# Patient Record
Sex: FEMALE | Race: WHITE | NOT HISPANIC OR LATINO | Employment: STUDENT | URBAN - METROPOLITAN AREA
[De-identification: names, ages, dates, MRNs, and addresses within clinical notes are randomized per-mention and may not be internally consistent; named-entity substitution may affect disease eponyms.]

---

## 2023-08-29 ENCOUNTER — HOSPITAL ENCOUNTER (OUTPATIENT)
Dept: DATA CONVERSION | Facility: HOSPITAL | Age: 23
Discharge: HOME | End: 2023-08-29
Payer: COMMERCIAL

## 2023-08-29 DIAGNOSIS — E55.9 VITAMIN D DEFICIENCY, UNSPECIFIED: ICD-10-CM

## 2023-08-29 DIAGNOSIS — E61.1 IRON DEFICIENCY: ICD-10-CM

## 2023-08-29 DIAGNOSIS — Z00.00 ENCOUNTER FOR GENERAL ADULT MEDICAL EXAMINATION WITHOUT ABNORMAL FINDINGS: ICD-10-CM

## 2023-08-29 DIAGNOSIS — Z13.6 ENCOUNTER FOR SCREENING FOR CARDIOVASCULAR DISORDERS: ICD-10-CM

## 2023-08-29 LAB
25(OH)D3 SERPL-MCNC: 18 NG/ML (ref 31–100)
ALBUMIN SERPL-MCNC: 4.1 GM/DL (ref 3.5–5)
ALBUMIN/GLOB SERPL: 1.3 RATIO (ref 1.5–3)
ALP BLD-CCNC: 47 U/L (ref 35–125)
ALT SERPL-CCNC: 11 U/L (ref 5–40)
ANION GAP SERPL CALCULATED.3IONS-SCNC: 11 MMOL/L (ref 0–19)
APPEARANCE PLAS: CLEAR
AST SERPL-CCNC: 13 U/L (ref 5–40)
BILIRUB SERPL-MCNC: 0.4 MG/DL (ref 0.1–1.2)
BUN SERPL-MCNC: 7 MG/DL (ref 8–25)
BUN/CREAT SERPL: 11.7 RATIO (ref 8–21)
CALCIUM SERPL-MCNC: 9.4 MG/DL (ref 8.5–10.4)
CHLORIDE SERPL-SCNC: 103 MMOL/L (ref 97–107)
CHOLEST SERPL-MCNC: 159 MG/DL (ref 133–200)
CHOLEST/HDLC SERPL: 4.7 RATIO
CO2 SERPL-SCNC: 24 MMOL/L (ref 24–31)
COLOR SPUN FLD: YELLOW
CREAT SERPL-MCNC: 0.6 MG/DL (ref 0.4–1.6)
DEPRECATED RDW RBC AUTO: 40.5 FL (ref 37–54)
ERYTHROCYTE [DISTWIDTH] IN BLOOD BY AUTOMATED COUNT: 12 % (ref 11.7–15)
FASTING STATUS PATIENT QL REPORTED: ABNORMAL
FERRITIN SERPL-MCNC: 122 NG/ML (ref 13–150)
GFR SERPL CREATININE-BSD FRML MDRD: 131 ML/MIN/1.73 M2
GLOBULIN SER-MCNC: 3.2 G/DL (ref 1.9–3.7)
GLUCOSE SERPL-MCNC: 94 MG/DL (ref 65–99)
HCT VFR BLD AUTO: 39 % (ref 36–44)
HDLC SERPL-MCNC: 34 MG/DL
HGB BLD-MCNC: 13.3 GM/DL (ref 12–15)
IRON SATN MFR SERPL: 24.2 % (ref 12–50)
IRON SERPL-MCNC: 64 UG/DL (ref 30–160)
LDLC SERPL CALC-MCNC: 115 MG/DL (ref 65–130)
MCH RBC QN AUTO: 31.3 PG (ref 26–34)
MCHC RBC AUTO-ENTMCNC: 34.1 % (ref 31–37)
MCV RBC AUTO: 91.8 FL (ref 80–100)
NRBC BLD-RTO: 0 /100 WBC
PLATELET # BLD AUTO: 270 K/UL (ref 150–450)
PMV BLD AUTO: 10 CU (ref 7–12.6)
POTASSIUM SERPL-SCNC: 4.1 MMOL/L (ref 3.4–5.1)
PROT SERPL-MCNC: 7.3 G/DL (ref 5.9–7.9)
RBC # BLD AUTO: 4.25 M/UL (ref 4–4.9)
SODIUM SERPL-SCNC: 138 MMOL/L (ref 133–145)
T4 FREE SERPL-MCNC: 1.5 NG/DL (ref 0.9–1.7)
TIBC SERPL-MCNC: 265 UG/DL (ref 228–428)
TRIGL SERPL-MCNC: 50 MG/DL (ref 40–150)
TSH SERPL DL<=0.05 MIU/L-ACNC: 0.26 MIU/L (ref 0.27–4.2)
WBC # BLD AUTO: 7.8 K/UL (ref 4.5–11)

## 2023-09-07 ENCOUNTER — HOSPITAL ENCOUNTER (OUTPATIENT)
Dept: DATA CONVERSION | Facility: HOSPITAL | Age: 23
Discharge: HOME | End: 2023-09-07
Payer: COMMERCIAL

## 2023-09-07 DIAGNOSIS — R11.2 NAUSEA WITH VOMITING, UNSPECIFIED: ICD-10-CM

## 2023-09-07 DIAGNOSIS — R50.9 FEVER, UNSPECIFIED: ICD-10-CM

## 2023-09-07 DIAGNOSIS — R82.998 OTHER ABNORMAL FINDINGS IN URINE: ICD-10-CM

## 2023-09-07 DIAGNOSIS — J34.89 OTHER SPECIFIED DISORDERS OF NOSE AND NASAL SINUSES: ICD-10-CM

## 2023-09-07 LAB
BACTERIA SPEC CULT: NORMAL
EUA DISCLAIMER: NORMAL
FLUAV RNA NPH QL NAA+PROBE: NEGATIVE
FLUBV RNA NPH QL NAA+PROBE: NEGATIVE
REPORT STATUS -LH SQ DATA CONVERSION: NORMAL
SARS-COV-2 RNA SPEC QL NAA+PROBE: NEGATIVE
SERVICE CMNT-IMP: NORMAL
SPECIMEN SOURCE: NORMAL

## 2023-11-19 DIAGNOSIS — E55.9 VITAMIN D DEFICIENCY: Primary | ICD-10-CM

## 2023-11-19 DIAGNOSIS — Z76.0 MEDICATION REFILL: ICD-10-CM

## 2023-11-20 RX ORDER — ERGOCALCIFEROL 1.25 MG/1
1 CAPSULE ORAL
Qty: 12 CAPSULE | Refills: 0 | OUTPATIENT
Start: 2023-11-20

## 2023-11-20 NOTE — TELEPHONE ENCOUNTER
Refill not appropriate.  She needs repeat vitamin D level testing to determine if high-dose supplementation is still appropriate.  Repeat labs ordered in this encounter.  If still low, can consider further prescription dose supplement.

## 2023-11-20 NOTE — TELEPHONE ENCOUNTER
Call placed to patient, no answer. Left message advising of PCP documentation below and lab hours to get the Vitamin D level done. Advised via vm to call back with any questions she may have.

## 2023-11-21 ENCOUNTER — LAB (OUTPATIENT)
Dept: LAB | Facility: LAB | Age: 23
End: 2023-11-21
Payer: COMMERCIAL

## 2023-11-21 DIAGNOSIS — E55.9 VITAMIN D DEFICIENCY: ICD-10-CM

## 2023-11-21 LAB — 25(OH)D3 SERPL-MCNC: 41 NG/ML (ref 31–100)

## 2023-11-21 PROCEDURE — 82306 VITAMIN D 25 HYDROXY: CPT

## 2023-11-21 PROCEDURE — 36415 COLL VENOUS BLD VENIPUNCTURE: CPT

## 2024-02-02 ENCOUNTER — HOSPITAL ENCOUNTER (OUTPATIENT)
Dept: CARDIOLOGY | Facility: HOSPITAL | Age: 24
Discharge: HOME | End: 2024-02-02
Payer: COMMERCIAL

## 2024-02-02 DIAGNOSIS — Z13.6 ENCOUNTER FOR SCREENING FOR CARDIOVASCULAR DISORDERS: ICD-10-CM

## 2024-02-02 PROCEDURE — 93005 ELECTROCARDIOGRAM TRACING: CPT

## 2024-02-04 ENCOUNTER — HOSPITAL ENCOUNTER (EMERGENCY)
Facility: HOSPITAL | Age: 24
Discharge: HOME | End: 2024-02-04
Attending: EMERGENCY MEDICINE
Payer: COMMERCIAL

## 2024-02-04 ENCOUNTER — APPOINTMENT (OUTPATIENT)
Dept: RADIOLOGY | Facility: HOSPITAL | Age: 24
End: 2024-02-04
Payer: COMMERCIAL

## 2024-02-04 ENCOUNTER — APPOINTMENT (OUTPATIENT)
Dept: CARDIOLOGY | Facility: HOSPITAL | Age: 24
End: 2024-02-04
Payer: COMMERCIAL

## 2024-02-04 VITALS
BODY MASS INDEX: 21.57 KG/M2 | HEART RATE: 94 BPM | HEIGHT: 64 IN | OXYGEN SATURATION: 100 % | RESPIRATION RATE: 20 BRPM | DIASTOLIC BLOOD PRESSURE: 86 MMHG | TEMPERATURE: 98.1 F | SYSTOLIC BLOOD PRESSURE: 140 MMHG | WEIGHT: 126.32 LBS

## 2024-02-04 DIAGNOSIS — Z71.6 ENCOUNTER FOR SMOKING CESSATION COUNSELING: ICD-10-CM

## 2024-02-04 DIAGNOSIS — J45.21 MILD INTERMITTENT ASTHMA WITH EXACERBATION (HHS-HCC): Primary | ICD-10-CM

## 2024-02-04 DIAGNOSIS — U07.1 COVID: ICD-10-CM

## 2024-02-04 LAB
ANION GAP SERPL CALC-SCNC: 9 MMOL/L
BUN SERPL-MCNC: 7 MG/DL (ref 8–25)
CALCIUM SERPL-MCNC: 9.3 MG/DL (ref 8.5–10.4)
CHLORIDE SERPL-SCNC: 102 MMOL/L (ref 97–107)
CO2 SERPL-SCNC: 27 MMOL/L (ref 24–31)
CREAT SERPL-MCNC: 0.6 MG/DL (ref 0.4–1.6)
EGFRCR SERPLBLD CKD-EPI 2021: >90 ML/MIN/1.73M*2
ERYTHROCYTE [DISTWIDTH] IN BLOOD BY AUTOMATED COUNT: 12.3 % (ref 11.5–14.5)
GLUCOSE SERPL-MCNC: 94 MG/DL (ref 65–99)
HCT VFR BLD AUTO: 41.7 % (ref 36–46)
HGB BLD-MCNC: 14.1 G/DL (ref 12–16)
MCH RBC QN AUTO: 31.5 PG (ref 26–34)
MCHC RBC AUTO-ENTMCNC: 33.8 G/DL (ref 32–36)
MCV RBC AUTO: 93 FL (ref 80–100)
NRBC BLD-RTO: 0 /100 WBCS (ref 0–0)
PLATELET # BLD AUTO: 224 X10*3/UL (ref 150–450)
POTASSIUM SERPL-SCNC: 4 MMOL/L (ref 3.4–5.1)
RBC # BLD AUTO: 4.47 X10*6/UL (ref 4–5.2)
SODIUM SERPL-SCNC: 138 MMOL/L (ref 133–145)
WBC # BLD AUTO: 5.6 X10*3/UL (ref 4.4–11.3)

## 2024-02-04 PROCEDURE — 85027 COMPLETE CBC AUTOMATED: CPT | Performed by: EMERGENCY MEDICINE

## 2024-02-04 PROCEDURE — 71045 X-RAY EXAM CHEST 1 VIEW: CPT

## 2024-02-04 PROCEDURE — 2500000004 HC RX 250 GENERAL PHARMACY W/ HCPCS (ALT 636 FOR OP/ED): Performed by: EMERGENCY MEDICINE

## 2024-02-04 PROCEDURE — 99283 EMERGENCY DEPT VISIT LOW MDM: CPT | Mod: 25

## 2024-02-04 PROCEDURE — 36415 COLL VENOUS BLD VENIPUNCTURE: CPT | Performed by: EMERGENCY MEDICINE

## 2024-02-04 PROCEDURE — 80048 BASIC METABOLIC PNL TOTAL CA: CPT | Performed by: EMERGENCY MEDICINE

## 2024-02-04 PROCEDURE — 93005 ELECTROCARDIOGRAM TRACING: CPT

## 2024-02-04 PROCEDURE — 2500000002 HC RX 250 W HCPCS SELF ADMINISTERED DRUGS (ALT 637 FOR MEDICARE OP, ALT 636 FOR OP/ED): Performed by: EMERGENCY MEDICINE

## 2024-02-04 PROCEDURE — 99284 EMERGENCY DEPT VISIT MOD MDM: CPT | Mod: 25 | Performed by: EMERGENCY MEDICINE

## 2024-02-04 PROCEDURE — 94640 AIRWAY INHALATION TREATMENT: CPT

## 2024-02-04 PROCEDURE — 2500000001 HC RX 250 WO HCPCS SELF ADMINISTERED DRUGS (ALT 637 FOR MEDICARE OP): Performed by: EMERGENCY MEDICINE

## 2024-02-04 RX ORDER — IPRATROPIUM BROMIDE AND ALBUTEROL SULFATE 2.5; .5 MG/3ML; MG/3ML
3 SOLUTION RESPIRATORY (INHALATION) ONCE
Status: COMPLETED | OUTPATIENT
Start: 2024-02-04 | End: 2024-02-04

## 2024-02-04 RX ORDER — IBUPROFEN 600 MG/1
600 TABLET ORAL ONCE
Status: COMPLETED | OUTPATIENT
Start: 2024-02-04 | End: 2024-02-04

## 2024-02-04 RX ORDER — ALBUTEROL SULFATE 0.83 MG/ML
2.5 SOLUTION RESPIRATORY (INHALATION) EVERY 6 HOURS PRN
Qty: 3 ML | Refills: 1 | Status: SHIPPED | OUTPATIENT
Start: 2024-02-04 | End: 2024-05-21

## 2024-02-04 RX ORDER — ACETAMINOPHEN 325 MG/1
650 TABLET ORAL ONCE
Status: COMPLETED | OUTPATIENT
Start: 2024-02-04 | End: 2024-02-04

## 2024-02-04 RX ORDER — NEBULIZER AND COMPRESSOR
1 EACH MISCELLANEOUS AS NEEDED
Qty: 1 EACH | Refills: 0 | Status: SHIPPED | OUTPATIENT
Start: 2024-02-04

## 2024-02-04 RX ORDER — PREDNISONE 10 MG/1
20 TABLET ORAL DAILY
Qty: 8 TABLET | Refills: 0 | Status: SHIPPED | OUTPATIENT
Start: 2024-02-04 | End: 2024-02-08

## 2024-02-04 RX ADMIN — IBUPROFEN 600 MG: 600 TABLET, FILM COATED ORAL at 11:56

## 2024-02-04 RX ADMIN — ACETAMINOPHEN 650 MG: 325 TABLET ORAL at 11:56

## 2024-02-04 RX ADMIN — DEXAMETHASONE 10 MG: 6 TABLET ORAL at 11:56

## 2024-02-04 RX ADMIN — IPRATROPIUM BROMIDE AND ALBUTEROL SULFATE 3 ML: 2.5; .5 SOLUTION RESPIRATORY (INHALATION) at 11:57

## 2024-02-04 ASSESSMENT — COLUMBIA-SUICIDE SEVERITY RATING SCALE - C-SSRS
6. HAVE YOU EVER DONE ANYTHING, STARTED TO DO ANYTHING, OR PREPARED TO DO ANYTHING TO END YOUR LIFE?: NO
2. HAVE YOU ACTUALLY HAD ANY THOUGHTS OF KILLING YOURSELF?: NO
1. IN THE PAST MONTH, HAVE YOU WISHED YOU WERE DEAD OR WISHED YOU COULD GO TO SLEEP AND NOT WAKE UP?: NO

## 2024-02-04 ASSESSMENT — PAIN SCALES - GENERAL: PAINLEVEL_OUTOF10: 0 - NO PAIN

## 2024-02-04 ASSESSMENT — PAIN - FUNCTIONAL ASSESSMENT: PAIN_FUNCTIONAL_ASSESSMENT: 0-10

## 2024-02-04 NOTE — DISCHARGE INSTRUCTIONS
As discussed, you can use over-the-counter ibuprofen Tylenol but also use albuterol nebulizer as needed and take the prednisone for the next few days.  If your symptoms worsen despite the medications may come back to the emergency room for repeat evaluation.

## 2024-02-04 NOTE — ED PROVIDER NOTES
EMERGENCY DEPARTMENT ENCOUNTER      [ ] CODE STEMI [ ] CODE Neuro [ ] CODE Yellow [ ] Modified Trauma [ ] CODE Blue      CHIEF COMPLAINT      Chief Complaint   Patient presents with    Shortness of Breath     Pt complains of cough, congestion, chest tightness, fatigue, weakness for a couple weeks.  Tested positive for covid today.       Mode of Arrival:  Primary Care Provider: Sylvie Renee MD  Medical Record Number: [id number]      History obtained by: Patient  Limited by nothing  Time seen: 12:48 PM    QUALITY MEASURES   PPE Utilized: N95 with goggles and gloves      HPI      Dayami Tafoya is a 23 y.o. female with a history of asthma, vape air, states that she developed general malaise fatigue fever and chills about 2 weeks ago with a dry cough but then about a week ago tested and was positive for COVID.  Does state that at some point she had been on Augmentin along with over-the-counter medications but today feels slightly more chest tightness and thinks that this is exacerbating her asthma.  Admits that she is still vaping but doing much less than before.  Also states that when she went to an urgent care a week ago to get the Augmentin was also told that her EKG had an abnormality that just required follow-up with cardiologist but has not attempted to yet.  Does have an albuterol MDI at home but does not have a nebulizer.  Has been taking over-the-counter Mucinex.  Thinks that there was a family member that was sick about 2 weeks ago.  Denies any arely chest pain or other tightness associated with asthma.  Denies any palpitations diaphoresis syncope.  Has been able to hydrate.  No other complaints.      Patient otherwise denies fever, chills, n/v/d, chest pain,   abdominal pain, dysuria, hematuria, swollen extremities or skin changes, hematemesis, hematochezia, melena or any other accompanying symptoms of late.      The patient has no other complaints at this time.               PAST MEDICAL HISTORY     Past Medical History:   Diagnosis Date    Other conditions influencing health status 11/07/2017    No significant past medical history    Other conditions influencing health status 11/07/2017    No significant past surgical history         I have personally reviewed the patient's past medical history in the records.  Kenneth Castillo MD    SURGICAL HISTORY    Past Surgical History:   Procedure Laterality Date    OTHER SURGICAL HISTORY  09/15/2021    Esophagogastroduodenoscopy    OTHER SURGICAL HISTORY  09/21/2021    Bronchoscopy       I have personally reviewed the patient's past surgical history in the records.  Kenneth Castillo MD    CURRENT MEDICATIONS    I have reviewed the patient’s medications.   Please see nursing and pharmacy records for the most up to date list.     [unfilled]    ALLERGIES    Allergies   Allergen Reactions    Latex Rash       I have personally reviewed the patient's past history of allergies in the records.  Kenneth Castillo MD    FAMILY HISTORY    No family history on file.    I have personally reviewed the patient's family history in the records.  Kenneth Castillo MD    SOCIAL HISTORY    Social History     Socioeconomic History    Marital status: Single     Spouse name: Not on file    Number of children: Not on file    Years of education: Not on file    Highest education level: Not on file   Occupational History    Not on file   Tobacco Use    Smoking status: Not on file    Smokeless tobacco: Not on file   Substance and Sexual Activity    Alcohol use: Not on file    Drug use: Not on file    Sexual activity: Not on file   Other Topics Concern    Not on file   Social History Narrative    Not on file     Social Determinants of Health     Financial Resource Strain: Not on file   Food Insecurity: Not on file   Transportation Needs: Not on file   Physical Activity: Not on file   Stress: Not on file   Social Connections: Not on file   Intimate Partner Violence: Not on file   Housing Stability: Not on file  "        I have personally reviewed the patient's social history in the records.  Kenneth Castillo MD    REVIEW OF SYSTEMS      14 point ROS was reviewed and negative except as noted above in HPI.      PHYSICAL EXAM    VITAL SIGNS:    /86   Pulse 94   Temp 36.7 °C (98.1 °F)   Resp 20   Ht 1.626 m (5' 4\")   Wt 57.3 kg (126 lb 5.2 oz)   SpO2 100%   BMI 21.68 kg/m²    Review EMR for vital signs  Nursing note and vitals reviewed.    Constitutional:  Alert and oriented, well-developed, well-nourished, appears stated age, non-toxic appearing  HENT:  Normocephalic, atraumatic, bilateral external ears normal, oropharynx moist, Nose normal.  Neck: normal range of motion, no tenderness, supple, no stridor.  Eyes:  PERRL, EOMI, conjunctiva normal, no discharge.   Cardiovascular:  Normal heart rate, normal rhythm, no murmurs, no rubs, no gallops.   Respiratory: Slightly decreased breath sounds, no respiratory distress, faint end expiratory wheezing, no chest wall tenderness.   GI:  Bowel sounds normal, soft, no tenderness, no rebound or guarding, no distention, no masses pulsatile or otherwise   (any female  exam was done with female chaperone present):   Deferred  Integument:  Warm, dry, no erythema, no rash, no edema.   Back:  No midline tenderness, no CVA tenderness.   Musculoskeletal:  Intact distal pulses, no tenderness, no cyanosis, no clubbing, with capillary refill less than 2 seconds. Good range of motion in all major joints. No tenderness to palpation or major deformities noted.    Neurologic:  Alert & oriented x 3, normal motor function, normal sensory function, no focal deficits noted. Cranial nerves II-XII intact.  Psychiatric:  Affect normal, judgment normal, mood normal.         EKG    Performed at   1202  , HR of   75 ,     NSR, NAD, no sign of STEMI or NSTEMI, no Q wave or T wave abnormality noted.    Reviewed and interpreted by me at time performed    Reviewed and interpreted by me, Kenneth Castillo " MD        CARDIAC MONITORING    Cardiac monitor reveals normal sinus rhythm as interpreted by me.   Cardiac monitor was ordered secondary to patient's history of chest pain/palpitations/near-syncope/syncope/sob/stroke and to monitor the patient for dysrhythmia.      RADIOLOGY  XR chest 1 view   Final Result   No acute cardiopulmonary disease.        Signed by: Bao Stinson 2/4/2024 12:17 PM   Dictation workstation:   [id number]          All Imaging studies evaluated and interpreted by ED physician except when noted otherwise.    ED PROVIDER INTERPRETATION (XRAYS ONLY):       *I have interpreted the x-ray real-time in the ED myself, and made a clinical decision on it prior to the formal radiology reading.    Kenneth Castillo M.D.    RADIOLOGIST IMPRESSION (U/S, CT, MRI):   XR chest 1 view   Final Result   No acute cardiopulmonary disease.        Signed by: Bao Stinson 2/4/2024 12:17 PM   Dictation workstation:   [id number]            PERTINENT LABS    Please refer to the chart for all lab work and to MDM for relevant discussion.      PROCEDURE    None (procdoc)    ED COURSE & MEDICAL DECISION MAKING    Pertinent Labs & Imaging studies reviewed. (See chart for details)      MDM:      Assessment: Dayami Tafoya is a 23 y.o.female who presents to the ED with suspected asthma exacerbation from a combination of COVID infection and continued vaping and discussed tobacco cessation with patient for which she states she would quit on her own without any medications however did agree to Decadron in the ED, DuoNebs, basic lab work chest x-ray and EKG and if she does improve we will give prednisone for the next 2 days as I suspect that there is a bronchitis component and will also recommend nebulizer machine along with albuterol nebulizer vials.  Patient understands and agrees with plan        Prior records in EPIC reviewed by me.     2023 Coding Requirements:  --Independent historian(s):    see HPI  --Review of  "prior records:    EHR reviewed   --Relevant comorbidities:    see records  --Social determinants of health:            I have considered the following conditions in my assessment of this patient's condition:  cough, asthma, chest pain including pleuritic, dyspnea, atrial fibrillation, congestive heart failure, pulmonary edema, diabetic ketoacidosis, myocardial infarct acute, pneumonia including   aspiration and pseudomonal wrist, pneumothorax including tension, pulmonary   embolus, respiratory failure, sepsis, SIRS, URI, bronchitis, foreign body aspiration.      Chest x-ray within normal limits and EKG unremarkable.  CBC within normal limits, patient feeling better after discussion patient she agrees to discharge with the medications as described above and strict turn precautions            ED VITALS  Vitals:    02/04/24 1047   BP: 140/86   Pulse: 94   Resp: 20   Temp: 36.7 °C (98.1 °F)   SpO2: 100%   Weight: 57.3 kg (126 lb 5.2 oz)   Height: 1.626 m (5' 4\")       BP  Min: 140/86  Max: 140/86    As part of the 2022 [facility] reporting requirements, the following measures have been reviewed and documented:    None     1. Mild intermittent asthma with exacerbation    2. COVID    3. Encounter for smoking cessation counseling        Diagnoses as of 02/04/24 1248   Mild intermittent asthma with exacerbation   COVID   Encounter for smoking cessation counseling         DISPOSITION       DISCHARGE.  The patient is discharged back to their place of residence.  Discharge diagnosis, instructions and plan were discussed and understood. At the time of discharge the patient was comfortable and was in no apparent distress. Patient is aware of diagnostic uncertainty and was notified though testing is negative here, there is a very small chance that pathology may be missed.  The patient understands these risks and the patient /family understood to return immediately to the emergency department if the symptoms worsen or if they have any " additional concerns.    DISCHARGE MEDICATIONS  New Prescriptions    No medications on file       FOLLOW UP  No follow-up provider specified.    Kenneth Castillo    This note was created with the assistance of voice recognition technology.  While attempts were made to ensure accuracy, mis-transcription may be present due to limitations in the software.        Electronically signed by MD Kenneth Valencia MD  02/04/24 6621

## 2024-02-12 LAB
ATRIAL RATE: 75 BPM
P AXIS: 72 DEGREES
P OFFSET: 197 MS
P ONSET: 157 MS
PR INTERVAL: 138 MS
Q ONSET: 226 MS
QRS COUNT: 12 BEATS
QRS DURATION: 82 MS
QT INTERVAL: 384 MS
QTC CALCULATION(BAZETT): 428 MS
QTC FREDERICIA: 413 MS
R AXIS: 77 DEGREES
T AXIS: 59 DEGREES
T OFFSET: 418 MS
VENTRICULAR RATE: 75 BPM

## 2024-02-14 LAB
ATRIAL RATE: 65 BPM
P AXIS: 64 DEGREES
P OFFSET: 199 MS
P ONSET: 153 MS
PR INTERVAL: 142 MS
Q ONSET: 224 MS
QRS COUNT: 10 BEATS
QRS DURATION: 88 MS
QT INTERVAL: 402 MS
QTC CALCULATION(BAZETT): 418 MS
QTC FREDERICIA: 412 MS
R AXIS: 89 DEGREES
T AXIS: 57 DEGREES
T OFFSET: 425 MS
VENTRICULAR RATE: 65 BPM

## 2024-05-21 ENCOUNTER — OFFICE VISIT (OUTPATIENT)
Dept: PRIMARY CARE | Facility: CLINIC | Age: 24
End: 2024-05-21
Payer: COMMERCIAL

## 2024-05-21 VITALS
OXYGEN SATURATION: 97 % | WEIGHT: 126 LBS | SYSTOLIC BLOOD PRESSURE: 100 MMHG | HEIGHT: 64 IN | BODY MASS INDEX: 21.51 KG/M2 | TEMPERATURE: 97.6 F | HEART RATE: 102 BPM | DIASTOLIC BLOOD PRESSURE: 78 MMHG

## 2024-05-21 DIAGNOSIS — J01.90 SUBACUTE SINUSITIS, UNSPECIFIED LOCATION: ICD-10-CM

## 2024-05-21 DIAGNOSIS — J30.2 SEASONAL ALLERGIES: Primary | ICD-10-CM

## 2024-05-21 PROCEDURE — 99214 OFFICE O/P EST MOD 30 MIN: CPT | Performed by: STUDENT IN AN ORGANIZED HEALTH CARE EDUCATION/TRAINING PROGRAM

## 2024-05-21 RX ORDER — DOXYCYCLINE 100 MG/1
100 CAPSULE ORAL 2 TIMES DAILY
Qty: 14 CAPSULE | Refills: 0 | Status: SHIPPED | OUTPATIENT
Start: 2024-05-21 | End: 2024-05-28

## 2024-05-21 RX ORDER — SODIUM CHLORIDE 1 G/1
TABLET ORAL
COMMUNITY

## 2024-05-21 RX ORDER — ERENUMAB-AOOE 70 MG/ML
INJECTION SUBCUTANEOUS
COMMUNITY

## 2024-05-21 RX ORDER — CEFDINIR 300 MG/1
300 CAPSULE ORAL 2 TIMES DAILY
Qty: 20 CAPSULE | Refills: 0 | Status: CANCELLED | OUTPATIENT
Start: 2024-05-21 | End: 2024-05-31

## 2024-05-21 RX ORDER — CETIRIZINE HYDROCHLORIDE 10 MG/1
10 TABLET ORAL DAILY
Qty: 30 TABLET | Refills: 2 | Status: SHIPPED | OUTPATIENT
Start: 2024-05-21 | End: 2024-08-19

## 2024-05-21 RX ORDER — FLUTICASONE PROPIONATE 50 MCG
1 SPRAY, SUSPENSION (ML) NASAL DAILY
Qty: 16 G | Refills: 1 | Status: SHIPPED | OUTPATIENT
Start: 2024-05-21 | End: 2025-05-21

## 2024-05-21 ASSESSMENT — PATIENT HEALTH QUESTIONNAIRE - PHQ9
1. LITTLE INTEREST OR PLEASURE IN DOING THINGS: NOT AT ALL
SUM OF ALL RESPONSES TO PHQ9 QUESTIONS 1 AND 2: 0
2. FEELING DOWN, DEPRESSED OR HOPELESS: NOT AT ALL

## 2024-05-21 ASSESSMENT — PAIN SCALES - GENERAL: PAINLEVEL: 8

## 2024-05-21 NOTE — PATIENT INSTRUCTIONS
Thank you for coming to see me today.    Antibiotics sent to pharmacy along with allergy medication/nasal spray, take full course of antibiotic as prescribed.    You are also overdue for your yearly physical, please schedule physical/follow-up visit with me in 2 weeks.  Sooner if needed.

## 2024-05-21 NOTE — PROGRESS NOTES
"Subjective   Dayami Tafoya is a 23 y.o. female who presents for sinus and right ear pain.    HPI:    This is a 23-year-old female presenting with concern for possible ear infection.    Ear Pain:  Presenting with ongoing right ear pain.  Finished a course of Augmentin from [facility] urgent care approximately 6 days ago.  Did not have any significant symptom relief.    Completed full course of Augmentin and ear drops (ciprodex x7 days).  Ongoing ear pain and sinus pressure/congestion.    Breathing a little worse, using albuterol inhaler 2-3 times daily, but relieved with albuterol use.    ROS:   Review of systems is essentially negative for all systems except for any identified issues in HPI above.    Objective     /78   Pulse 102   Temp 36.4 °C (97.6 °F)   Ht 1.626 m (5' 4\")   Wt 57.2 kg (126 lb)   SpO2 97%   BMI 21.63 kg/m²      PHYSICAL EXAM    GENERAL  Well-appearing, pleasant and cooperative.  No acute distress.    HEENT  HEAD:   Normocephalic.  Atraumatic.  EYES:  PERRLA.  No scleral icterus or conjunctival injection.  EARS:  Tympanic membranes visualized bilaterally without erythema, fluid, or bulging.  NECK:  No adenopathy.  No palpable thyroid enlargement or nodules.    THROAT:  Moist oropharynx without tonsillar enlargement or exudates.    LUNGS:    Clear to auscultation bilaterally.  No wheezes, rales, rhonchi.    CARDIAC:  Regular rate and rhythm.  Normal S1S2.  No murmurs/rubs/gallops.    ABDOMEN:  Soft, non-tender, non-distended.  No hepatosplenomegaly.  Normoactive bowel sounds.    MUSCULOSKELETAL:  No gross abnormalities.   No joint swelling or erythema,.  No spinal or paraspinal tenderness to palpation.    EXTREMITIES:  No LE edema or cyanosis.      NEURO           Alert and oriented x3. No focal deficits.    PSYCH:          Affect appropriate.           Assessment/Plan   Problem List Items Addressed This Visit    None  Visit Diagnoses       Seasonal allergies    -  Primary    " Relevant Medications    cetirizine (ZyrTEC) 10 mg tablet    fluticasone (Flonase) 50 mcg/actuation nasal spray    Subacute sinusitis, unspecified location        Relevant Medications    doxycycline (Vibramycin) 100 mg capsule        Ears examined without evidence of ongoing infection bilateratlly.  Will treat with additional abx course for sinus sx, as well as allergy medications.    Counseling:   Medication education:    Education: The patient is counseled regarding potential side effects of all new medications.    Understanding:  Patient expressed understanding.    Adherence:  No barriers to adherence identified.           Sylvie Renee MD

## 2024-05-29 ENCOUNTER — APPOINTMENT (OUTPATIENT)
Dept: RADIOLOGY | Facility: HOSPITAL | Age: 24
End: 2024-05-29
Payer: MEDICARE

## 2024-05-29 ENCOUNTER — HOSPITAL ENCOUNTER (EMERGENCY)
Facility: HOSPITAL | Age: 24
Discharge: HOME | End: 2024-05-29
Attending: EMERGENCY MEDICINE
Payer: MEDICARE

## 2024-05-29 VITALS
BODY MASS INDEX: 22.32 KG/M2 | WEIGHT: 130.73 LBS | OXYGEN SATURATION: 100 % | HEIGHT: 64 IN | TEMPERATURE: 99.1 F | RESPIRATION RATE: 18 BRPM | SYSTOLIC BLOOD PRESSURE: 136 MMHG | HEART RATE: 88 BPM | DIASTOLIC BLOOD PRESSURE: 78 MMHG

## 2024-05-29 DIAGNOSIS — S16.1XXA STRAIN OF NECK MUSCLE, INITIAL ENCOUNTER: ICD-10-CM
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
## 2024-05-29 DIAGNOSIS — S39.012A BACK STRAIN, INITIAL ENCOUNTER: ICD-10-CM

## 2024-05-29 DIAGNOSIS — S06.0XAA CONCUSSION WITH UNKNOWN LOSS OF CONSCIOUSNESS STATUS, INITIAL ENCOUNTER: ICD-10-CM

## 2024-05-29 LAB
ALBUMIN SERPL-MCNC: 4.3 G/DL (ref 3.5–5)
ALP BLD-CCNC: 47 U/L (ref 35–125)
ALT SERPL-CCNC: 12 U/L (ref 5–40)
ANION GAP SERPL CALC-SCNC: 9 MMOL/L
APPEARANCE UR: CLEAR
AST SERPL-CCNC: 14 U/L (ref 5–40)
BASOPHILS # BLD AUTO: 0.06 X10*3/UL (ref 0–0.1)
BASOPHILS NFR BLD AUTO: 0.8 %
BILIRUB SERPL-MCNC: 0.2 MG/DL (ref 0.1–1.2)
BILIRUB UR STRIP.AUTO-MCNC: NEGATIVE MG/DL
BUN SERPL-MCNC: 12 MG/DL (ref 8–25)
CALCIUM SERPL-MCNC: 9.1 MG/DL (ref 8.5–10.4)
CHLORIDE SERPL-SCNC: 102 MMOL/L (ref 97–107)
CO2 SERPL-SCNC: 25 MMOL/L (ref 24–31)
COLOR UR: COLORLESS
CREAT SERPL-MCNC: 0.7 MG/DL (ref 0.4–1.6)
EGFRCR SERPLBLD CKD-EPI 2021: >90 ML/MIN/1.73M*2
EOSINOPHIL # BLD AUTO: 0.19 X10*3/UL (ref 0–0.7)
EOSINOPHIL NFR BLD AUTO: 2.6 %
ERYTHROCYTE [DISTWIDTH] IN BLOOD BY AUTOMATED COUNT: 12 % (ref 11.5–14.5)
GLUCOSE SERPL-MCNC: 97 MG/DL (ref 65–99)
GLUCOSE UR STRIP.AUTO-MCNC: NORMAL MG/DL
HCG UR QL IA.RAPID: NEGATIVE
HCT VFR BLD AUTO: 43.2 % (ref 36–46)
HGB BLD-MCNC: 14.5 G/DL (ref 12–16)
IMM GRANULOCYTES # BLD AUTO: 0.03 X10*3/UL (ref 0–0.7)
IMM GRANULOCYTES NFR BLD AUTO: 0.4 % (ref 0–0.9)
KETONES UR STRIP.AUTO-MCNC: NEGATIVE MG/DL
LEUKOCYTE ESTERASE UR QL STRIP.AUTO: NEGATIVE
LIPASE SERPL-CCNC: 26 U/L (ref 16–63)
LYMPHOCYTES # BLD AUTO: 2.93 X10*3/UL (ref 1.2–4.8)
LYMPHOCYTES NFR BLD AUTO: 40.7 %
MCH RBC QN AUTO: 31.5 PG (ref 26–34)
MCHC RBC AUTO-ENTMCNC: 33.6 G/DL (ref 32–36)
MCV RBC AUTO: 94 FL (ref 80–100)
MONOCYTES # BLD AUTO: 0.51 X10*3/UL (ref 0.1–1)
MONOCYTES NFR BLD AUTO: 7.1 %
NEUTROPHILS # BLD AUTO: 3.48 X10*3/UL (ref 1.2–7.7)
NEUTROPHILS NFR BLD AUTO: 48.4 %
NITRITE UR QL STRIP.AUTO: NEGATIVE
NRBC BLD-RTO: 0 /100 WBCS (ref 0–0)
PH UR STRIP.AUTO: 7.5 [PH]
PLATELET # BLD AUTO: 204 X10*3/UL (ref 150–450)
POTASSIUM SERPL-SCNC: 4.2 MMOL/L (ref 3.4–5.1)
PROT SERPL-MCNC: 7 G/DL (ref 5.9–7.9)
PROT UR STRIP.AUTO-MCNC: NEGATIVE MG/DL
RBC # BLD AUTO: 4.61 X10*6/UL (ref 4–5.2)
RBC # UR STRIP.AUTO: NEGATIVE /UL
SODIUM SERPL-SCNC: 136 MMOL/L (ref 133–145)
SP GR UR STRIP.AUTO: 1.01
UROBILINOGEN UR STRIP.AUTO-MCNC: NORMAL MG/DL
WBC # BLD AUTO: 7.2 X10*3/UL (ref 4.4–11.3)

## 2024-05-29 PROCEDURE — 81003 URINALYSIS AUTO W/O SCOPE: CPT | Performed by: EMERGENCY MEDICINE

## 2024-05-29 PROCEDURE — 83690 ASSAY OF LIPASE: CPT | Performed by: EMERGENCY MEDICINE

## 2024-05-29 PROCEDURE — 96374 THER/PROPH/DIAG INJ IV PUSH: CPT | Mod: 59

## 2024-05-29 PROCEDURE — 81025 URINE PREGNANCY TEST: CPT | Performed by: EMERGENCY MEDICINE

## 2024-05-29 PROCEDURE — 72125 CT NECK SPINE W/O DYE: CPT | Performed by: RADIOLOGY

## 2024-05-29 PROCEDURE — 2500000004 HC RX 250 GENERAL PHARMACY W/ HCPCS (ALT 636 FOR OP/ED): Performed by: EMERGENCY MEDICINE

## 2024-05-29 PROCEDURE — 84132 ASSAY OF SERUM POTASSIUM: CPT | Performed by: EMERGENCY MEDICINE

## 2024-05-29 PROCEDURE — 70450 CT HEAD/BRAIN W/O DYE: CPT

## 2024-05-29 PROCEDURE — 71260 CT THORAX DX C+: CPT | Performed by: RADIOLOGY

## 2024-05-29 PROCEDURE — 72125 CT NECK SPINE W/O DYE: CPT

## 2024-05-29 PROCEDURE — 74177 CT ABD & PELVIS W/CONTRAST: CPT

## 2024-05-29 PROCEDURE — 96375 TX/PRO/DX INJ NEW DRUG ADDON: CPT | Mod: 59

## 2024-05-29 PROCEDURE — 36415 COLL VENOUS BLD VENIPUNCTURE: CPT | Performed by: EMERGENCY MEDICINE

## 2024-05-29 PROCEDURE — 96361 HYDRATE IV INFUSION ADD-ON: CPT

## 2024-05-29 PROCEDURE — 85025 COMPLETE CBC W/AUTO DIFF WBC: CPT | Performed by: EMERGENCY MEDICINE

## 2024-05-29 PROCEDURE — 70450 CT HEAD/BRAIN W/O DYE: CPT | Performed by: RADIOLOGY

## 2024-05-29 PROCEDURE — 2550000001 HC RX 255 CONTRASTS: Performed by: EMERGENCY MEDICINE

## 2024-05-29 PROCEDURE — 99285 EMERGENCY DEPT VISIT HI MDM: CPT | Mod: 25

## 2024-05-29 PROCEDURE — 74177 CT ABD & PELVIS W/CONTRAST: CPT | Performed by: RADIOLOGY

## 2024-05-29 RX ORDER — ORPHENADRINE CITRATE 30 MG/ML
60 INJECTION INTRAMUSCULAR; INTRAVENOUS ONCE
Status: COMPLETED | OUTPATIENT
Start: 2024-05-29 | End: 2024-05-29

## 2024-05-29 RX ORDER — DIPHENHYDRAMINE HYDROCHLORIDE 50 MG/ML
50 INJECTION INTRAMUSCULAR; INTRAVENOUS ONCE
Status: COMPLETED | OUTPATIENT
Start: 2024-05-29 | End: 2024-05-29

## 2024-05-29 RX ORDER — ORPHENADRINE CITRATE 30 MG/ML
60 INJECTION INTRAMUSCULAR; INTRAVENOUS ONCE
Status: DISCONTINUED | OUTPATIENT
Start: 2024-05-29 | End: 2024-05-29

## 2024-05-29 RX ORDER — ACETAMINOPHEN 325 MG/1
975 TABLET ORAL ONCE
Status: COMPLETED | OUTPATIENT
Start: 2024-05-29 | End: 2024-05-29

## 2024-05-29 RX ADMIN — SODIUM CHLORIDE 1000 ML: 900 INJECTION, SOLUTION INTRAVENOUS at 13:22

## 2024-05-29 RX ADMIN — ACETAMINOPHEN 975 MG: 325 TABLET ORAL at 13:21

## 2024-05-29 RX ADMIN — DIPHENHYDRAMINE HYDROCHLORIDE 50 MG: 50 INJECTION, SOLUTION INTRAMUSCULAR; INTRAVENOUS at 14:04

## 2024-05-29 RX ADMIN — SODIUM CHLORIDE 1000 ML: 900 INJECTION, SOLUTION INTRAVENOUS at 13:21

## 2024-05-29 RX ADMIN — METHYLPREDNISOLONE SODIUM SUCCINATE 125 MG: 125 INJECTION, POWDER, FOR SOLUTION INTRAMUSCULAR; INTRAVENOUS at 14:04

## 2024-05-29 RX ADMIN — IOHEXOL 75 ML: 350 INJECTION, SOLUTION INTRAVENOUS at 14:48

## 2024-05-29 RX ADMIN — ORPHENADRINE CITRATE 60 MG: 30 INJECTION, SOLUTION INTRAMUSCULAR; INTRAVENOUS at 13:21

## 2024-05-29 ASSESSMENT — COLUMBIA-SUICIDE SEVERITY RATING SCALE - C-SSRS
6. HAVE YOU EVER DONE ANYTHING, STARTED TO DO ANYTHING, OR PREPARED TO DO ANYTHING TO END YOUR LIFE?: NO
1. IN THE PAST MONTH, HAVE YOU WISHED YOU WERE DEAD OR WISHED YOU COULD GO TO SLEEP AND NOT WAKE UP?: NO
2. HAVE YOU ACTUALLY HAD ANY THOUGHTS OF KILLING YOURSELF?: NO

## 2024-05-29 ASSESSMENT — PAIN - FUNCTIONAL ASSESSMENT
PAIN_FUNCTIONAL_ASSESSMENT: 0-10
PAIN_FUNCTIONAL_ASSESSMENT: 0-10

## 2024-05-29 ASSESSMENT — PAIN DESCRIPTION - LOCATION: LOCATION: GENERALIZED

## 2024-05-29 ASSESSMENT — PAIN DESCRIPTION - ONSET: ONSET: GRADUAL

## 2024-05-29 ASSESSMENT — PAIN DESCRIPTION - DESCRIPTORS: DESCRIPTORS: ACHING

## 2024-05-29 ASSESSMENT — PAIN DESCRIPTION - PROGRESSION: CLINICAL_PROGRESSION: GRADUALLY WORSENING

## 2024-05-29 ASSESSMENT — PAIN SCALES - GENERAL: PAINLEVEL_OUTOF10: 7

## 2024-05-29 ASSESSMENT — PAIN DESCRIPTION - FREQUENCY: FREQUENCY: CONSTANT/CONTINUOUS
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
## 2024-05-29 NOTE — DISCHARGE INSTRUCTIONS
Follow-up closely with primary care better in the following 1 to 2 days.    Continue Tylenol and ibuprofen as needed for aches and pains.
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
## 2024-05-29 NOTE — Clinical Note
Dayami Tafoya was seen and treated in our emergency department on 5/29/2024.  She may return to work on 05/31/2024.       If you have any questions or concerns, please don't hesitate to call.      Gabi Zaragoza PA-C

## 2024-05-30 ENCOUNTER — TELEPHONE (OUTPATIENT)
Dept: PRIMARY CARE | Facility: CLINIC | Age: 24
End: 2024-05-30
Payer: COMMERCIAL

## 2024-05-30 NOTE — TELEPHONE ENCOUNTER
OK to schedule at 2:45 on 6/6.  Please make patient aware that we are overbooking the schedule for this appointment so very important that she arrives on time/early if possible for check in.
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
## 2024-06-06 ENCOUNTER — OFFICE VISIT (OUTPATIENT)
Dept: PRIMARY CARE | Facility: CLINIC | Age: 24
End: 2024-06-06
Payer: COMMERCIAL

## 2024-06-06 VITALS
HEART RATE: 78 BPM | DIASTOLIC BLOOD PRESSURE: 70 MMHG | SYSTOLIC BLOOD PRESSURE: 110 MMHG | HEIGHT: 64 IN | BODY MASS INDEX: 21.51 KG/M2 | OXYGEN SATURATION: 98 % | WEIGHT: 126 LBS | TEMPERATURE: 97.5 F

## 2024-06-06 DIAGNOSIS — M54.9 ACUTE BACK PAIN, UNSPECIFIED BACK LOCATION, UNSPECIFIED BACK PAIN LATERALITY: ICD-10-CM

## 2024-06-06 DIAGNOSIS — K92.1 BLOOD IN STOOL: ICD-10-CM

## 2024-06-06 DIAGNOSIS — R10.84 GENERALIZED ABDOMINAL PAIN: ICD-10-CM
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
## 2024-06-06 DIAGNOSIS — R11.0 NAUSEA: ICD-10-CM

## 2024-06-06 DIAGNOSIS — M54.2 NECK PAIN: ICD-10-CM

## 2024-06-06 DIAGNOSIS — K92.1 BLACK STOOL: ICD-10-CM

## 2024-06-06 PROCEDURE — 99214 OFFICE O/P EST MOD 30 MIN: CPT | Performed by: STUDENT IN AN ORGANIZED HEALTH CARE EDUCATION/TRAINING PROGRAM

## 2024-06-06 PROCEDURE — 82270 OCCULT BLOOD FECES: CPT | Performed by: STUDENT IN AN ORGANIZED HEALTH CARE EDUCATION/TRAINING PROGRAM

## 2024-06-06 RX ORDER — ONDANSETRON 4 MG/1
4 TABLET, ORALLY DISINTEGRATING ORAL EVERY 8 HOURS PRN
Qty: 20 TABLET | Refills: 0 | Status: SHIPPED | OUTPATIENT
Start: 2024-06-06 | End: 2024-06-13

## 2024-06-06 RX ORDER — DEXTROMETHORPHAN HYDROBROMIDE, GUAIFENESIN 5; 100 MG/5ML; MG/5ML
650 LIQUID ORAL EVERY 8 HOURS PRN
COMMUNITY

## 2024-06-06 RX ORDER — IBUPROFEN 200 MG
200 TABLET ORAL EVERY 6 HOURS
COMMUNITY

## 2024-06-06 ASSESSMENT — PATIENT HEALTH QUESTIONNAIRE - PHQ9
2. FEELING DOWN, DEPRESSED OR HOPELESS: NOT AT ALL
SUM OF ALL RESPONSES TO PHQ9 QUESTIONS 1 AND 2: 0
1. LITTLE INTEREST OR PLEASURE IN DOING THINGS: NOT AT ALL

## 2024-06-06 ASSESSMENT — PAIN SCALES - GENERAL: PAINLEVEL: 7

## 2024-06-06 NOTE — PATIENT INSTRUCTIONS
Thank you for coming to see me today.    Go to the lab for blood test, please complete stool sample collection and bring to the lab with you.    Zofran prescription sent to pharmacy.    Physical therapy referral entered, call to schedule.    Go to the ED for worsening of symptoms as we discussed.
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
## 2024-06-07 ENCOUNTER — LAB (OUTPATIENT)
Dept: LAB | Facility: LAB | Age: 24
End: 2024-06-07
Payer: COMMERCIAL

## 2024-06-07 DIAGNOSIS — R11.0 NAUSEA: ICD-10-CM

## 2024-06-07 DIAGNOSIS — R10.84 GENERALIZED ABDOMINAL PAIN: ICD-10-CM

## 2024-06-07 DIAGNOSIS — K92.1 BLACK STOOL: ICD-10-CM

## 2024-06-07 LAB
ALBUMIN SERPL-MCNC: 4.3 G/DL (ref 3.5–5)
ALP BLD-CCNC: 41 U/L (ref 35–125)
ALT SERPL-CCNC: 13 U/L (ref 5–40)
ANION GAP SERPL CALC-SCNC: 12 MMOL/L
AST SERPL-CCNC: 22 U/L (ref 5–40)
BASOPHILS # BLD AUTO: 0.05 X10*3/UL (ref 0–0.1)
BASOPHILS NFR BLD AUTO: 0.8 %
BILIRUB SERPL-MCNC: 0.2 MG/DL (ref 0.1–1.2)
BUN SERPL-MCNC: 13 MG/DL (ref 8–25)
CALCIUM SERPL-MCNC: 8.9 MG/DL (ref 8.5–10.4)
CHLORIDE SERPL-SCNC: 105 MMOL/L (ref 97–107)
CO2 SERPL-SCNC: 21 MMOL/L (ref 24–31)
CREAT SERPL-MCNC: 0.7 MG/DL (ref 0.4–1.6)
EGFRCR SERPLBLD CKD-EPI 2021: >90 ML/MIN/1.73M*2
EOSINOPHIL # BLD AUTO: 0.18 X10*3/UL (ref 0–0.7)
EOSINOPHIL NFR BLD AUTO: 3 %
ERYTHROCYTE [DISTWIDTH] IN BLOOD BY AUTOMATED COUNT: 12.1 % (ref 11.5–14.5)
GLUCOSE SERPL-MCNC: 87 MG/DL (ref 65–99)
HCT VFR BLD AUTO: 39.5 % (ref 36–46)
HGB BLD-MCNC: 13.4 G/DL (ref 12–16)
IMM GRANULOCYTES # BLD AUTO: 0.03 X10*3/UL (ref 0–0.7)
IMM GRANULOCYTES NFR BLD AUTO: 0.5 % (ref 0–0.9)
LYMPHOCYTES # BLD AUTO: 2.36 X10*3/UL (ref 1.2–4.8)
LYMPHOCYTES NFR BLD AUTO: 39.3 %
MCH RBC QN AUTO: 31.9 PG (ref 26–34)
MCHC RBC AUTO-ENTMCNC: 33.9 G/DL (ref 32–36)
MCV RBC AUTO: 94 FL (ref 80–100)
MONOCYTES # BLD AUTO: 0.42 X10*3/UL (ref 0.1–1)
MONOCYTES NFR BLD AUTO: 7 %
NEUTROPHILS # BLD AUTO: 2.96 X10*3/UL (ref 1.2–7.7)
NEUTROPHILS NFR BLD AUTO: 49.4 %
NRBC BLD-RTO: 0 /100 WBCS (ref 0–0)
PLATELET # BLD AUTO: 215 X10*3/UL (ref 150–450)
POTASSIUM SERPL-SCNC: 4.5 MMOL/L (ref 3.4–5.1)
PROT SERPL-MCNC: 6.6 G/DL (ref 5.9–7.9)
RBC # BLD AUTO: 4.2 X10*6/UL (ref 4–5.2)
SODIUM SERPL-SCNC: 138 MMOL/L (ref 133–145)
WBC # BLD AUTO: 6 X10*3/UL (ref 4.4–11.3)

## 2024-06-07 PROCEDURE — 80053 COMPREHEN METABOLIC PANEL: CPT

## 2024-06-07 PROCEDURE — 85025 COMPLETE CBC W/AUTO DIFF WBC: CPT

## 2024-06-07 PROCEDURE — 36415 COLL VENOUS BLD VENIPUNCTURE: CPT

## 2024-06-10 ENCOUNTER — LAB (OUTPATIENT)
Dept: LAB | Facility: LAB | Age: 24
End: 2024-06-10
Payer: COMMERCIAL

## 2024-06-10 LAB — HEMOCCULT SP1 STL QL: POSITIVE

## 2024-06-12 ENCOUNTER — OFFICE VISIT (OUTPATIENT)
Dept: PRIMARY CARE | Facility: CLINIC | Age: 24
End: 2024-06-12
Payer: COMMERCIAL

## 2024-06-12 VITALS
DIASTOLIC BLOOD PRESSURE: 70 MMHG | HEART RATE: 89 BPM | SYSTOLIC BLOOD PRESSURE: 110 MMHG | WEIGHT: 129 LBS | TEMPERATURE: 97.7 F | HEIGHT: 64 IN | BODY MASS INDEX: 22.02 KG/M2 | OXYGEN SATURATION: 99 %

## 2024-06-12 DIAGNOSIS — Z13.6 SCREENING FOR CARDIOVASCULAR CONDITION: ICD-10-CM

## 2024-06-12 DIAGNOSIS — E55.9 VITAMIN D DEFICIENCY: ICD-10-CM

## 2024-06-12 DIAGNOSIS — K92.1 BLACK STOOL: ICD-10-CM

## 2024-06-12 DIAGNOSIS — R10.84 GENERALIZED ABDOMINAL PAIN: ICD-10-CM

## 2024-06-12 DIAGNOSIS — Z00.00 ANNUAL PHYSICAL EXAM: Primary | ICD-10-CM

## 2024-06-12 DIAGNOSIS — K92.1 BLOOD IN STOOL: ICD-10-CM

## 2024-06-12 PROCEDURE — 99395 PREV VISIT EST AGE 18-39: CPT | Performed by: STUDENT IN AN ORGANIZED HEALTH CARE EDUCATION/TRAINING PROGRAM

## 2024-06-12 PROCEDURE — 99214 OFFICE O/P EST MOD 30 MIN: CPT | Performed by: STUDENT IN AN ORGANIZED HEALTH CARE EDUCATION/TRAINING PROGRAM

## 2024-06-12 ASSESSMENT — PAIN SCALES - GENERAL: PAINLEVEL: 6

## 2024-06-12 ASSESSMENT — PATIENT HEALTH QUESTIONNAIRE - PHQ9
SUM OF ALL RESPONSES TO PHQ9 QUESTIONS 1 AND 2: 0
1. LITTLE INTEREST OR PLEASURE IN DOING THINGS: NOT AT ALL
2. FEELING DOWN, DEPRESSED OR HOPELESS: NOT AT ALL

## 2024-06-12 NOTE — PROGRESS NOTES
"Subjective   Dayami Tafoya is a 23 y.o. female who presents for Annual Exam.    HPI:      PREVENTATIVE HEALTH CARE:    Immunizations:  COVID:  DUE  TDaP:  8/20/2015 - utd  Pneumonia:  not currently indicated, quit smoking over a year ago      Immunization History   Administered Date(s) Administered    DTaP vaccine, pediatric  (INFANRIX) 2002, 2002, 01/15/2008    HPV 9-valent vaccine (GARDASIL 9) 08/20/2015, 11/04/2016    Hepatitis A vaccine, pediatric/adolescent (HAVRIX, VAQTA) 11/04/2011, 01/29/2013    Hepatitis B vaccine, 19 yrs and under (RECOMBIVAX, ENGERIX) 2002, 2002, 2002    Hib (HbOC) 2002, 2002, 2002, 10/27/2003    Influenza, live, intranasal, quadrivalent 11/04/2011    MMR vaccine, subcutaneous (MMR II) 01/15/2008    Meningococcal ACWY vaccine (MENVEO) 07/15/2020    Meningococcal B vaccine (BEXSERO) 07/15/2020, 02/08/2022    Moderna SARS-CoV-2 Vaccination 05/18/2021, 06/15/2021    Pneumococcal Conjugate PCV 7 2002, 2002    Poliovirus vaccine, subcutaneous (IPOL) 01/15/2008    Varicella vaccine, subcutaneous (VARIVAX) 11/04/2011, 01/29/2013       Screenings:  HIV/HCV:  negative x2 6/21/2022 - utd  Pap:  DUE    Positive Occut Blood in Stool:  Agreable to GI referral, had previously not been notified.        Component  Ref Range & Units    Occult Blood, Stool X1  Negative Positive Abnormal         Ongoing low back aand abdominal pain.  Has not picked up Zofran from pharmacy.   Taking Tylenol-Motrin, advised to take         ROS:    Review of systems is essentially negative for all systems except for any identified issues in HPI above.    Objective     /70   Pulse 89   Temp 36.5 °C (97.7 °F)   Ht 1.626 m (5' 4\")   Wt 58.5 kg (129 lb)   SpO2 99%   BMI 22.14 kg/m²      PHYSICAL EXAM    GENERAL  Well-appearing, pleasant and cooperative.  No acute distress.    HEENT  HEAD:   Normocephalic.  Atraumatic.  EYES:  PERRLA.  No scleral " icterus or conjunctival injection.  EARS:  Tympanic membranes visualized bilaterally without erythema, fluid, or bulging.  NECK:  No adenopathy.  No palpable thyroid enlargement or nodules.    THROAT:  Moist oropharynx without tonsillar enlargement or exudates.    LUNGS:    Clear to auscultation bilaterally.  No wheezes, rales, rhonchi.    CARDIAC:  Regular rate and rhythm.  Normal S1S2.  No murmurs/rubs/gallops.    ABDOMEN:  Mild lower abdominal TTP without guarding or rebound.  Abdomen otherwise soft, non-tender, non-distended.  No hepatosplenomegaly.  Normoactive bowel sounds.    MUSCULOSKELETAL:  No gross abnormalities.   No joint swelling or erythema,.  No spinal or paraspinal tenderness to palpation.    EXTREMITIES:  No LE edema or cyanosis.      NEURO           Alert and oriented x3. No focal deficits.    PSYCH:          Affect appropriate.           Assessment/Plan   Problem List Items Addressed This Visit    None  Visit Diagnoses       Annual physical exam    -  Primary    Relevant Orders    Lipid Panel    TSH with reflex to Free T4 if abnormal    Comprehensive Metabolic Panel    CBC    Screening for cardiovascular condition        Relevant Orders    Lipid Panel    Generalized abdominal pain        GI follow up encouraged - referral entered previously.  Encouraged to start Zofran rx previously prescribed.    Advised to start Zofran previously prescribed.    Relevant Orders    TSH with reflex to Free T4 if abnormal    Comprehensive Metabolic Panel    CBC    Black stool        GI follow up encouraged - referral entered previously.    Relevant Orders    CBC    Vitamin D deficiency        Relevant Orders    Vitamin D 25-Hydroxy,Total (for eval of Vitamin D levels)    Blood in stool        Relevant Orders    CBC                 Sylvie Renee MD

## 2024-06-12 NOTE — PATIENT INSTRUCTIONS
Thank you for coming to see me today.    Go to the lab for fasting blood work next week as discussed, we will call you with all results.    GI referral entered previously due to positive blood result in stool sample.  Call to schedule.     Zofran sent to pharmacy previously for ongoing nausea symptoms.  Can also take Tylenol, avoid ibuprofen and Aleve/naproxen due to GI bleeding as we discussed.    Follow-up with me for Pap smear as discussed.

## 2024-06-20 ENCOUNTER — LAB (OUTPATIENT)
Dept: LAB | Facility: LAB | Age: 24
End: 2024-06-20
Payer: COMMERCIAL

## 2024-06-20 DIAGNOSIS — Z00.00 ANNUAL PHYSICAL EXAM: ICD-10-CM

## 2024-06-20 DIAGNOSIS — E55.9 VITAMIN D DEFICIENCY: ICD-10-CM

## 2024-06-20 DIAGNOSIS — Z13.6 SCREENING FOR CARDIOVASCULAR CONDITION: ICD-10-CM

## 2024-06-20 DIAGNOSIS — K92.1 BLOOD IN STOOL: ICD-10-CM

## 2024-06-20 DIAGNOSIS — K92.1 BLACK STOOL: ICD-10-CM

## 2024-06-20 DIAGNOSIS — R10.84 GENERALIZED ABDOMINAL PAIN: ICD-10-CM

## 2024-06-20 LAB
25(OH)D3 SERPL-MCNC: 14 NG/ML (ref 31–100)
ALBUMIN SERPL-MCNC: 4.5 G/DL (ref 3.5–5)
ALP BLD-CCNC: 45 U/L (ref 35–125)
ALT SERPL-CCNC: 12 U/L (ref 5–40)
ANION GAP SERPL CALC-SCNC: 11 MMOL/L
AST SERPL-CCNC: 14 U/L (ref 5–40)
BILIRUB SERPL-MCNC: <0.2 MG/DL (ref 0.1–1.2)
BUN SERPL-MCNC: 11 MG/DL (ref 8–25)
CALCIUM SERPL-MCNC: 9.2 MG/DL (ref 8.5–10.4)
CHLORIDE SERPL-SCNC: 105 MMOL/L (ref 97–107)
CHOLEST SERPL-MCNC: 191 MG/DL (ref 133–200)
CHOLEST/HDLC SERPL: 3.6 {RATIO}
CO2 SERPL-SCNC: 23 MMOL/L (ref 24–31)
CREAT SERPL-MCNC: 0.8 MG/DL (ref 0.4–1.6)
EGFRCR SERPLBLD CKD-EPI 2021: >90 ML/MIN/1.73M*2
ERYTHROCYTE [DISTWIDTH] IN BLOOD BY AUTOMATED COUNT: 12.3 % (ref 11.5–14.5)
GLUCOSE SERPL-MCNC: 88 MG/DL (ref 65–99)
HCT VFR BLD AUTO: 41.3 % (ref 36–46)
HDLC SERPL-MCNC: 53 MG/DL
HGB BLD-MCNC: 13.6 G/DL (ref 12–16)
LDLC SERPL CALC-MCNC: 125 MG/DL (ref 65–130)
MCH RBC QN AUTO: 31.9 PG (ref 26–34)
MCHC RBC AUTO-ENTMCNC: 32.9 G/DL (ref 32–36)
MCV RBC AUTO: 97 FL (ref 80–100)
NRBC BLD-RTO: 0 /100 WBCS (ref 0–0)
PLATELET # BLD AUTO: 214 X10*3/UL (ref 150–450)
POTASSIUM SERPL-SCNC: 4.3 MMOL/L (ref 3.4–5.1)
PROT SERPL-MCNC: 6.6 G/DL (ref 5.9–7.9)
RBC # BLD AUTO: 4.27 X10*6/UL (ref 4–5.2)
SODIUM SERPL-SCNC: 139 MMOL/L (ref 133–145)
TRIGL SERPL-MCNC: 63 MG/DL (ref 40–150)
TSH SERPL DL<=0.05 MIU/L-ACNC: 0.43 MIU/L (ref 0.27–4.2)
WBC # BLD AUTO: 5.8 X10*3/UL (ref 4.4–11.3)

## 2024-06-20 PROCEDURE — 36415 COLL VENOUS BLD VENIPUNCTURE: CPT

## 2024-06-20 PROCEDURE — 80061 LIPID PANEL: CPT

## 2024-06-20 PROCEDURE — 82306 VITAMIN D 25 HYDROXY: CPT

## 2024-06-20 PROCEDURE — 85027 COMPLETE CBC AUTOMATED: CPT

## 2024-06-20 PROCEDURE — 84443 ASSAY THYROID STIM HORMONE: CPT

## 2024-06-20 PROCEDURE — 80053 COMPREHEN METABOLIC PANEL: CPT

## 2024-07-10 DIAGNOSIS — E55.9 VITAMIN D DEFICIENCY: ICD-10-CM

## 2024-07-10 RX ORDER — ERGOCALCIFEROL 1.25 MG/1
1 CAPSULE ORAL
Qty: 12 CAPSULE | Refills: 0 | Status: SHIPPED | OUTPATIENT
Start: 2024-07-14

## 2024-08-08 ENCOUNTER — APPOINTMENT (OUTPATIENT)
Dept: GASTROENTEROLOGY | Facility: HOSPITAL | Age: 24
End: 2024-08-08
Payer: COMMERCIAL

## 2024-08-28 ENCOUNTER — HOSPITAL ENCOUNTER (OUTPATIENT)
Dept: RADIOLOGY | Facility: CLINIC | Age: 24
End: 2024-08-28
Payer: COMMERCIAL

## 2024-09-03 ENCOUNTER — APPOINTMENT (OUTPATIENT)
Dept: GASTROENTEROLOGY | Facility: HOSPITAL | Age: 24
End: 2024-09-03
Payer: COMMERCIAL

## 2024-09-10 ENCOUNTER — HOSPITAL ENCOUNTER (OUTPATIENT)
Dept: RADIOLOGY | Facility: HOSPITAL | Age: 24
Discharge: HOME | End: 2024-09-10
Payer: COMMERCIAL

## 2024-09-10 ENCOUNTER — APPOINTMENT (OUTPATIENT)
Dept: LAB | Facility: LAB | Age: 24
End: 2024-09-10
Payer: COMMERCIAL

## 2024-09-10 DIAGNOSIS — R41.3 OTHER AMNESIA: ICD-10-CM

## 2024-09-10 PROCEDURE — 70551 MRI BRAIN STEM W/O DYE: CPT | Performed by: RADIOLOGY

## 2024-09-10 PROCEDURE — 70551 MRI BRAIN STEM W/O DYE: CPT

## 2024-09-14 ENCOUNTER — OFFICE VISIT (OUTPATIENT)
Dept: URGENT CARE | Age: 24
End: 2024-09-14
Payer: COMMERCIAL

## 2024-09-14 VITALS
HEART RATE: 65 BPM | TEMPERATURE: 98.5 F | DIASTOLIC BLOOD PRESSURE: 79 MMHG | RESPIRATION RATE: 16 BRPM | OXYGEN SATURATION: 98 % | SYSTOLIC BLOOD PRESSURE: 106 MMHG

## 2024-09-14 DIAGNOSIS — J01.01 ACUTE RECURRENT MAXILLARY SINUSITIS: Primary | ICD-10-CM

## 2024-09-14 RX ORDER — AMOXICILLIN AND CLAVULANATE POTASSIUM 875; 125 MG/1; MG/1
1 TABLET, FILM COATED ORAL 2 TIMES DAILY
Qty: 20 TABLET | Refills: 0 | Status: SHIPPED | OUTPATIENT
Start: 2024-09-14 | End: 2024-09-24

## 2024-09-14 ASSESSMENT — ENCOUNTER SYMPTOMS
SINUS COMPLAINT: 1
SINUS PAIN: 1
RHINORRHEA: 1
CARDIOVASCULAR NEGATIVE: 1
FACIAL SWELLING: 1
RESPIRATORY NEGATIVE: 1
EYES NEGATIVE: 1
GASTROINTESTINAL NEGATIVE: 1
SINUS PRESSURE: 1
CONSTITUTIONAL NEGATIVE: 1

## 2024-09-14 NOTE — LETTER
September 14, 2024     Patient: Dayami Tafoya   YOB: 2000   Date of Visit: 9/14/2024       To Whom It May Concern:    Dayami Tafoya was seen in my clinic on 9/14/2024 at 1:45 pm. Please excuse Dayami for her absence from work on this day 9/14/2024 and tomorrow 9/15/2024.    If you have any questions or concerns, please don't hesitate to call.         Sincerely,         [facility]        CC: No Recipients

## 2024-09-14 NOTE — PATIENT INSTRUCTIONS
Augmentin  ENT- please call  OTC probiotic  If worsening, please go to ER  Please follow up with your primary provider within one week if symptoms do not improve.  You may schedule an appointment online at [email or web address] or call [phone]. Go to the Emergency Department if symptoms significantly worsen or if you develop chest pain or shortness of breath.

## 2024-09-14 NOTE — PROGRESS NOTES
"Subjective   Patient ID: Dayami Tafoya is a 24 y.o. female. They present today with a chief complaint of Sinus Problem (Sinus pressure,congestion,headache,vomiting up mucus x 4 days /Recent MRI showed chronic sinusitis but can't get in with pcp or ENT until late october).    History of Present Illness  Ms. Tafoya is a 24 year old female who presents to the clinic with right sided sinus pressure and pain. Pt states this has been ongoing for the past 2.5 weeks. Pt had an MRI done on 9-9-2024 which showed chronic sinusitis.  Per the pt, she was advised to see PCP and ENT. Pt states last course of ABT was \"months ago\". Pt denies any chest pain,s ob, nv at this time.       Sinus Problem  Associated symptoms: congestion, ear pain and rhinorrhea        Past Medical History  Allergies as of 09/14/2024 - Reviewed 09/14/2024   Allergen Reaction Noted    Latex Rash 02/04/2024    Iodinated contrast media Hives 05/29/2024       (Not in a hospital admission)       Past Medical History:   Diagnosis Date    Other conditions influencing health status 11/07/2017    No significant past medical history    Other conditions influencing health status 11/07/2017    No significant past surgical history       Past Surgical History:   Procedure Laterality Date    OTHER SURGICAL HISTORY  09/15/2021    Esophagogastroduodenoscopy    OTHER SURGICAL HISTORY  09/21/2021    Bronchoscopy        reports that she has quit smoking. Her smoking use included cigarettes. She has never used smokeless tobacco. She reports current alcohol use.    Review of Systems  Review of Systems   Constitutional: Negative.    HENT:  Positive for congestion, ear pain, facial swelling, rhinorrhea, sinus pressure and sinus pain.    Eyes: Negative.    Respiratory: Negative.     Cardiovascular: Negative.    Gastrointestinal: Negative.                                   Objective    Vitals:    09/14/24 1357   BP: 106/79   Pulse: 65   Resp: 16   Temp: 36.9 °C (98.5 °F) "   SpO2: 98%     No LMP recorded.    Physical Exam  Constitutional:       Appearance: Normal appearance.   HENT:      Right Ear: Tympanic membrane normal.      Left Ear: Tympanic membrane normal.      Nose:      Right Sinus: Maxillary sinus tenderness and frontal sinus tenderness present.   Neurological:      Mental Status: She is alert.         Procedures    Point of Care Test & Imaging Results from this visit  No results found for this visit on 09/14/24.   No results found.    Diagnostic study results (if any) were reviewed by SEAMUS Hoffman.    Assessment/Plan   Allergies, medications, history, and pertinent labs/EKGs/Imaging reviewed by SEAMUS Hoffman.     Medical Decision Making  History and examination consistent with acute uncomplicated sinusitis. No indication for labs or imaging at this time. No evidence of sepsis, strep pharyngitis, or pneumonia. Counseled patient/family on antibiotic treatment and supportive measures at home. Patient advised to return to clinic or present to ED if symptoms change or worsen. Otherwise follow-up with PCP. Patient verbalized understanding and agrees with plan.     Orders and Diagnoses  Diagnoses and all orders for this visit:  Acute recurrent maxillary sinusitis  -     amoxicillin-pot clavulanate (Augmentin) 875-125 mg tablet; Take 1 tablet by mouth 2 times a day for 10 days.  -     Referral to ENT; Future        -OTC probiotic    Medical Admin Record      Follow Up Instructions  No follow-ups on file.    Patient disposition: Home    Please follow up with your primary provider within one week if symptoms do not improve.  You may schedule an appointment online at [email or web address] or call [phone]. Go to the Emergency Department if symptoms significantly worsen or if you develop chest pain or shortness of breath.    Electronically signed by SEAMUS Hoffman  2:13 PM

## 2024-09-17 ENCOUNTER — OFFICE VISIT (OUTPATIENT)
Dept: OTOLARYNGOLOGY | Facility: CLINIC | Age: 24
End: 2024-09-17
Payer: COMMERCIAL

## 2024-09-17 VITALS — TEMPERATURE: 98.1 F | WEIGHT: 122.6 LBS | HEIGHT: 63 IN | BODY MASS INDEX: 21.72 KG/M2

## 2024-09-17 DIAGNOSIS — J34.89 NASAL AND SINUS DISCHARGE: ICD-10-CM

## 2024-09-17 DIAGNOSIS — J34.2 NASAL SEPTAL DEVIATION: ICD-10-CM

## 2024-09-17 DIAGNOSIS — J01.01 ACUTE RECURRENT MAXILLARY SINUSITIS: ICD-10-CM

## 2024-09-17 DIAGNOSIS — J34.89 NASAL OBSTRUCTION: ICD-10-CM

## 2024-09-17 DIAGNOSIS — J32.0 CHRONIC RIGHT MAXILLARY SINUSITIS: Primary | ICD-10-CM

## 2024-09-17 PROCEDURE — 99204 OFFICE O/P NEW MOD 45 MIN: CPT | Performed by: NURSE PRACTITIONER

## 2024-09-17 PROCEDURE — 31231 NASAL ENDOSCOPY DX: CPT | Performed by: NURSE PRACTITIONER

## 2024-09-17 PROCEDURE — 3008F BODY MASS INDEX DOCD: CPT | Performed by: NURSE PRACTITIONER

## 2024-09-17 RX ORDER — FLUTICASONE PROPIONATE 50 MCG
2 SPRAY, SUSPENSION (ML) NASAL DAILY
Qty: 16 G | Refills: 3 | Status: SHIPPED | OUTPATIENT
Start: 2024-09-17 | End: 2024-10-17

## 2024-09-17 RX ORDER — DOXYCYCLINE 100 MG/1
100 TABLET ORAL 2 TIMES DAILY
Qty: 20 TABLET | Refills: 0 | Status: SHIPPED | OUTPATIENT
Start: 2024-09-17 | End: 2024-09-27

## 2024-09-17 ASSESSMENT — PATIENT HEALTH QUESTIONNAIRE - PHQ9
2. FEELING DOWN, DEPRESSED OR HOPELESS: NOT AT ALL
1. LITTLE INTEREST OR PLEASURE IN DOING THINGS: NOT AT ALL
SUM OF ALL RESPONSES TO PHQ9 QUESTIONS 1 AND 2: 0

## 2024-09-17 NOTE — PATIENT INSTRUCTIONS
Today you were evaluated by Krystle Kraus CNP.    Please follow-up in 4-6 weeks or sooner if needed. If you have any questions or concerns, please contact my office at [phone].     Please contact our scheduling and  service at [phone]. They will work with you to get your test, imaging, or other appointments scheduled that might have been ordered.    You will be started on Doxycycline, twice daily, for 10 days.    As discussed, use of doxycycline can cause a skin sensitivity reaction with the sun. Avoid sun exposure while taking this medication. Also, do not take this medication with dairy. Take a daily probiotic.

## 2024-09-17 NOTE — LETTER
September 17, 2024     Anirudh Cheatham, RY-[facility]  [address] [address]    Patient: Dayami Tafoya   YOB: 2000   Date of Visit: 9/17/2024       Dear Dr. Anirudh Cheatham, APRANGELES-CNP:    Thank you for referring Dayami Tafoya to me for evaluation. Below are my notes for this consultation.  If you have questions, please do not hesitate to call me. I look forward to following your patient along with you.       Sincerely,     Krystle Kraus, RY-CNP      CC: No Recipients  ______________________________________________________________________________________    Subjective  Patient ID: Dayami Tafoya is a 24 y.o. female who presents for Sinusitis and Nausea.  HPI  Dayami aTfoya is a 24 y.o. old female, referred by Anirudh Cheatham CNP,  presenting with complaints of sinus and nose problems that began about one year ago.   Patient endorse facial pressure, rhinorrhea (yellow/green), facial pain, periorbital edema, nasal obstruction (right), throat clearing, hoarseness, intermittent loss of taste and smell. The patient denies epistaxis. The patient has taken antibiotics, steroids, and Flonase to alleviate the problem. The medications don't help. She states her infections never fully go away. The patient has not tried nasal saline irrigations. Does have a history of allergic rhinitis or allergies. Managed with Zyrtec. They deny a history of aspirin sensitivity. Does have asthma. They report 6-7 sinus infections per year requiring antibiotic treatment. Some infections take multiple rounds of antibiotics to clear. Most recent antibiotic usage includes: Augmentin BID X 10 days starting 9/14/24. States with infections her right cheek gets visibly swollen. She also endorse a foul odor in her nose currently and with previous infections. She does have a fractured right upper tooth.    History of prior nasal/sinus surgery or procedure: Denies.     I have also reviewed prior note from  Anirudh Linden CNP  dated 9/14/24 and this is contributing to my history and assessment.     I personally reviewed the patients MRI scan images and results. I discussed the results personally with the patient. The following findings were discussed: 9/10/24: MRI Brain: Left nasal septal deviation. Mild mucosal thickening in the right maxillary sinus. Otherwise, paranasal sinuses are clear.     Review of Systems  Review of systems is negative for constitutional, ophthalmological, cardiac, pulmonary, renal, gastrointestinal, musculoskeletal, mental health, endocrine, or neurologic disorders (except as listed in the HPI, PMH, and Problem List).     Objective  Physical Exam  CONSTITUTIONAL: Vital signs reviewed. Patient appears well developed and well nourished.   GENERAL: this is a healthy appearing female who appears stated age. The patient is alert and appropriately verbally conversant without hoarseness. This patient is in no apparent distress.   FACE: The face was inspected and no cutaneous masses or lesions were visualized. There was no erythema or edema noted. Facial movement was symmetric. No skin lesions were detected. There was no sinus tenderness elicited. TMJ crepitus absent.   EYES: Extra-ocular muscle function was intact. No nystagmus was observed. Pupils were equal.   CRANIAL NERVES: Cranial nerves II, III, IV, and VI were noted to be intact via extra-ocular muscle movement testing. Cranial nerve VII noted to be intact and symmetric by facial movement. Cranial nerves IX and X noted to be intact by gag reflex and palatal movement. Cranial nerve XII noted to be intact by active and symmetric tongue movement.   NOSE: Examination of the nose revealed the nasal dorsum to be midline. Intranasal exam reveals the septum is deviated left. The inferior turbinates were minimal. No masses, polyps, mucopus, or other lesion on anterior rhinoscopy. See below procedure note as applicable for further exam.  ORAL CAVITY:  Examination of the oral cavity revealed no mass lesions nor infection. The palate was noted to be intact. The tongue exhibited normal mobility. Mucosa was moist without lesion. The lips were free of lesion. Gums were free of inflammation. Dentition: normal without obvious infection or inflammation. Right upper dentition, one tooth has a fracture, nearly in half. No visible abscess.   OROPHARYNX: The oral pharynx was free of mass lesion or mucosal abnormality. The palate was noted to be without lesion. The uvula was normal appearing. The tonsils were Normal.  EARS: Examination of the ears revealed that the auricles were normally formed with no lesions. The external auditory canals were normal. The tympanic membranes were intact.  There is no inflammation visualized.   NECK: Visualization and palpation of the neck revealed no mass lesions. No skin lesions or inflammatory processes were detected. The cervical musculature was normal to palpation.   CERVICAL LYMPHATICS: There were no palpable lymph nodes in the posterior triangle, submandibular triangle, jugulodigastric region, or central neck.  RESPIRATORY: Normal inspiration and expiration and chest wall expansion, no use of accessory muscles to breathe, no stridor.  NEUROLOGICAL: Patient is ambulatory without assist. Mentation is clear. Answering questions appropriately.     Nasal / sinus endoscopy    Date/Time: 9/17/2024 12:25 PM    Performed by: SEAMUS Aburto  Authorized by: SEAMUS Aburto    Consent:     Consent obtained:  Verbal    Consent given by:  Patient    Risks discussed:  Bleeding, infection and pain    Alternatives discussed:  No treatment, observation and delayed treatment  Procedure details:     Indications: assessment of airway and sino-nasal symptoms      Medication:  Afrin and Jatinder-Synephrine 2%    Instrument: flexible fiberoptic nasal endoscope      Scope location: bilateral nare    Post-procedure details:     Patient  tolerance of procedure:  Tolerated well, no immediate complications  Comments:      Findings: After topical decongestion with decongestant and anesthetic spray, nasal endoscopy was performed using an endoscope. The septum was deviated left. The inferior turbinates were minimal.  The middle turbinates appeared healthy, the middle meatus is free of purulence, masses, lesions or polyps on the left. On the right, purulence from the middle meatus, pooling in the posterior pharynx.  The superior meatus and sphenoethmoid recess is clear on the right. The nasal passageway is patent. The nasopharynx was clear. There were no complications and the patient tolerated the procedure well.        Assessment/Plan    ASSESSMENT:  Dayami Tafoya is a 24 y.o. year old female with symptoms of nasal drainage, nasal obstruction and clinical findings consistent with chronic right maxillary sinusitis. Patient also noted to have a left nasal septal deviation. Concern of odontogenic source with right upper tooth fracture. Recommended addressing dental concerns, doxycycline x 10 days.       PLAN:  1) Nasal Endoscopy today.Findings: left dev, purulence from right middle meatus  2) I personally reviewed the patients MRI scan images and results. I discussed the results personally with the patient. The following findings were discussed: 9/10/24: MRI Brain: Left nasal septal deviation. Mild mucosal thickening in the right maxillary sinus. Otherwise, paranasal sinuses are clear.   Reassurance and counseling provided to patient and his condition was extensively discussed including as noted below:  3) We will start the patient on antibiotics to treat current infection. Rx for doxycycline BID x 10 days. Advised to take with food and discontinue for adverse effects. Instructed to stop augmentin.  4) Patient instructed to begin saline irrigations, ideally 1-2 times daily. Discussed appropriate technique and provided with a sample bottle.   5)  Patient was instructed to start steroid nasal spray after the irrigations.  6) Discussed seeing dentistry for evaluation of her right upper dentition as possible source of her right maxillary sinusitis and recurrent infection.  7) The patient will follow up in 4-6 weeks or sooner if needed to determine further steps in care.

## 2024-09-17 NOTE — PROGRESS NOTES
Subjective   Patient ID: Dayami Tafoya is a 24 y.o. female who presents for Sinusitis and Nausea.  HPI  Dayami Tafoya is a 24 y.o. old female, referred by Anirudh Cheatham CNP,  presenting with complaints of sinus and nose problems that began about one year ago.   Patient endorse facial pressure, rhinorrhea (yellow/green), facial pain, periorbital edema, nasal obstruction (right), throat clearing, hoarseness, intermittent loss of taste and smell. The patient denies epistaxis. The patient has taken antibiotics, steroids, and Flonase to alleviate the problem. The medications don't help. She states her infections never fully go away. The patient has not tried nasal saline irrigations. Does have a history of allergic rhinitis or allergies. Managed with Zyrtec. They deny a history of aspirin sensitivity. Does have asthma. They report 6-7 sinus infections per year requiring antibiotic treatment. Some infections take multiple rounds of antibiotics to clear. Most recent antibiotic usage includes: Augmentin BID X 10 days starting 9/14/24. States with infections her right cheek gets visibly swollen. She also endorse a foul odor in her nose currently and with previous infections. She does have a fractured right upper tooth.    History of prior nasal/sinus surgery or procedure: Denies.     I have also reviewed prior note from Anirudh Cheatham CNP  dated 9/14/24 and this is contributing to my history and assessment.     I personally reviewed the patients MRI scan images and results. I discussed the results personally with the patient. The following findings were discussed: 9/10/24: MRI Brain: Left nasal septal deviation. Mild mucosal thickening in the right maxillary sinus. Otherwise, paranasal sinuses are clear.     Review of Systems  Review of systems is negative for constitutional, ophthalmological, cardiac, pulmonary, renal, gastrointestinal, musculoskeletal, mental health, endocrine, or neurologic disorders (except  as listed in the HPI, PMH, and Problem List).     Objective   Physical Exam  CONSTITUTIONAL: Vital signs reviewed. Patient appears well developed and well nourished.   GENERAL: this is a healthy appearing female who appears stated age. The patient is alert and appropriately verbally conversant without hoarseness. This patient is in no apparent distress.   FACE: The face was inspected and no cutaneous masses or lesions were visualized. There was no erythema or edema noted. Facial movement was symmetric. No skin lesions were detected. There was no sinus tenderness elicited. TMJ crepitus absent.   EYES: Extra-ocular muscle function was intact. No nystagmus was observed. Pupils were equal.   CRANIAL NERVES: Cranial nerves II, III, IV, and VI were noted to be intact via extra-ocular muscle movement testing. Cranial nerve VII noted to be intact and symmetric by facial movement. Cranial nerves IX and X noted to be intact by gag reflex and palatal movement. Cranial nerve XII noted to be intact by active and symmetric tongue movement.   NOSE: Examination of the nose revealed the nasal dorsum to be midline. Intranasal exam reveals the septum is deviated left. The inferior turbinates were minimal. No masses, polyps, mucopus, or other lesion on anterior rhinoscopy. See below procedure note as applicable for further exam.  ORAL CAVITY: Examination of the oral cavity revealed no mass lesions nor infection. The palate was noted to be intact. The tongue exhibited normal mobility. Mucosa was moist without lesion. The lips were free of lesion. Gums were free of inflammation. Dentition: normal without obvious infection or inflammation. Right upper dentition, one tooth has a fracture, nearly in half. No visible abscess.   OROPHARYNX: The oral pharynx was free of mass lesion or mucosal abnormality. The palate was noted to be without lesion. The uvula was normal appearing. The tonsils were Normal.  EARS: Examination of the ears revealed  that the auricles were normally formed with no lesions. The external auditory canals were normal. The tympanic membranes were intact.  There is no inflammation visualized.   NECK: Visualization and palpation of the neck revealed no mass lesions. No skin lesions or inflammatory processes were detected. The cervical musculature was normal to palpation.   CERVICAL LYMPHATICS: There were no palpable lymph nodes in the posterior triangle, submandibular triangle, jugulodigastric region, or central neck.  RESPIRATORY: Normal inspiration and expiration and chest wall expansion, no use of accessory muscles to breathe, no stridor.  NEUROLOGICAL: Patient is ambulatory without assist. Mentation is clear. Answering questions appropriately.     Nasal / sinus endoscopy    Date/Time: 9/17/2024 12:25 PM    Performed by: SEAMUS Aburto  Authorized by: SEAMUS Aburto    Consent:     Consent obtained:  Verbal    Consent given by:  Patient    Risks discussed:  Bleeding, infection and pain    Alternatives discussed:  No treatment, observation and delayed treatment  Procedure details:     Indications: assessment of airway and sino-nasal symptoms      Medication:  Afrin and Jatinder-Synephrine 2%    Instrument: flexible fiberoptic nasal endoscope      Scope location: bilateral nare    Post-procedure details:     Patient tolerance of procedure:  Tolerated well, no immediate complications  Comments:      Findings: After topical decongestion with decongestant and anesthetic spray, nasal endoscopy was performed using an endoscope. The septum was deviated left. The inferior turbinates were minimal.  The middle turbinates appeared healthy, the middle meatus is free of purulence, masses, lesions or polyps on the left. On the right, purulence from the middle meatus, pooling in the posterior pharynx.  The superior meatus and sphenoethmoid recess is clear on the right. The nasal passageway is patent. The nasopharynx was  clear. There were no complications and the patient tolerated the procedure well.        Assessment/Plan     ASSESSMENT:  Dayami Tafoya is a 24 y.o. year old female with symptoms of nasal drainage, nasal obstruction and clinical findings consistent with chronic right maxillary sinusitis. Patient also noted to have a left nasal septal deviation. Concern of odontogenic source with right upper tooth fracture. Recommended addressing dental concerns, doxycycline x 10 days.       PLAN:  1) Nasal Endoscopy today.Findings: left dev, purulence from right middle meatus  2) I personally reviewed the patients MRI scan images and results. I discussed the results personally with the patient. The following findings were discussed: 9/10/24: MRI Brain: Left nasal septal deviation. Mild mucosal thickening in the right maxillary sinus. Otherwise, paranasal sinuses are clear.   Reassurance and counseling provided to patient and his condition was extensively discussed including as noted below:  3) We will start the patient on antibiotics to treat current infection. Rx for doxycycline BID x 10 days. Advised to take with food and discontinue for adverse effects. Instructed to stop augmentin.  4) Patient instructed to begin saline irrigations, ideally 1-2 times daily. Discussed appropriate technique and provided with a sample bottle.   5) Patient was instructed to start steroid nasal spray after the irrigations.  6) Discussed seeing dentistry for evaluation of her right upper dentition as possible source of her right maxillary sinusitis and recurrent infection.  7) The patient will follow up in 4-6 weeks or sooner if needed to determine further steps in care.

## 2024-09-25 ENCOUNTER — OFFICE VISIT (OUTPATIENT)
Dept: PRIMARY CARE | Facility: CLINIC | Age: 24
End: 2024-09-25
Payer: COMMERCIAL

## 2024-09-25 VITALS
WEIGHT: 119 LBS | BODY MASS INDEX: 21.09 KG/M2 | HEIGHT: 63 IN | SYSTOLIC BLOOD PRESSURE: 100 MMHG | OXYGEN SATURATION: 98 % | DIASTOLIC BLOOD PRESSURE: 60 MMHG | TEMPERATURE: 98 F | HEART RATE: 73 BPM

## 2024-09-25 DIAGNOSIS — Z23 ENCOUNTER FOR IMMUNIZATION: ICD-10-CM

## 2024-09-25 DIAGNOSIS — Z71.85 VACCINE COUNSELING: ICD-10-CM

## 2024-09-25 DIAGNOSIS — R79.89 LOW TSH LEVEL: Primary | ICD-10-CM

## 2024-09-25 PROCEDURE — 3008F BODY MASS INDEX DOCD: CPT | Performed by: STUDENT IN AN ORGANIZED HEALTH CARE EDUCATION/TRAINING PROGRAM

## 2024-09-25 PROCEDURE — 99214 OFFICE O/P EST MOD 30 MIN: CPT | Performed by: STUDENT IN AN ORGANIZED HEALTH CARE EDUCATION/TRAINING PROGRAM

## 2024-09-25 PROCEDURE — 90656 IIV3 VACC NO PRSV 0.5 ML IM: CPT | Performed by: STUDENT IN AN ORGANIZED HEALTH CARE EDUCATION/TRAINING PROGRAM

## 2024-09-25 PROCEDURE — 90471 IMMUNIZATION ADMIN: CPT | Performed by: STUDENT IN AN ORGANIZED HEALTH CARE EDUCATION/TRAINING PROGRAM

## 2024-09-25 NOTE — PATIENT INSTRUCTIONS
Thank you for coming to see me today.    Flu shot in clinic today.    Go to the lab for repeat thyroid testing in 6 weeks, future labs ordered today (you do NOT need to be fasting).    Thyroid US ordered, you can schedule this at the  as you leave, or use a scheduling number provided.    Endocrinology referral entered today, call to schedule.    Follow-up pending lab/imaging results.

## 2024-09-25 NOTE — PROGRESS NOTES
Subjective   Dayami Tafoya is a 24 y.o. female who presents for thyroid results.    HPI:      This is a 24-year-old female presenting for lab follow-up and thyroid concerns.  Last seen by me for yearly physical on 6/12/2024.    Thyroid Concerns:  Told by neurologist that her TSH levels were low.  Recent labs reviewed, her TSH was borderline low at 0.26 on 8/29/2023, T4 normal at that time.  Most recent TSH checked following her recent physical in June is within normal limits.    Had subsequent labs drawn with neurologist, mildly low TSH, normal T4.    Lab Results   Component Value Date    TSH 0.43 06/20/2024     Component      Latest Ref Rng 8/29/2023   Thyroxine, Free      0.9 - 1.7 NG/DL 1.5    Thyroid Stimulating Hormone      0.27 - 4.20 MIU/L 0.26 (L)         Vaccine Counseling:  Agreeable to flu vaccination in clinic today.  Hx of Latex allergy (rash only, no severe reaction/anaphylaxis).    Immunization History   Administered Date(s) Administered    DTaP vaccine, pediatric  (INFANRIX) 2000, 2000, 01/15/2008    Flu vaccine, trivalent, preservative free, age 6 months and greater (Fluarix/Fluzone/Flulaval) 09/25/2024    HPV 9-valent vaccine (GARDASIL 9) 08/20/2015, 11/04/2016    Hepatitis A vaccine, pediatric/adolescent (HAVRIX, VAQTA) 11/04/2011, 01/29/2013    Hepatitis B vaccine, 19 yrs and under (RECOMBIVAX, ENGERIX) 2002, 2002, 2002    Hib (HbOC) 2002, 2002, 2002, 10/27/2003    Influenza, live, intranasal, quadrivalent 11/04/2011    MMR vaccine, subcutaneous (MMR II) 01/15/2008    Meningococcal ACWY vaccine (MENVEO) 07/15/2020    Meningococcal B vaccine (BEXSERO) 07/15/2020, 02/08/2022    Moderna SARS-CoV-2 Vaccination 05/18/2021, 06/15/2021    Pneumococcal Conjugate PCV 7 2002, 2002    Poliovirus vaccine, subcutaneous (IPOL) 01/15/2008    Varicella vaccine, subcutaneous (VARIVAX) 11/04/2011, 01/29/2013         ROS:   Review of systems is  "essentially negative for all systems except for any identified issues in HPI above.    Objective     /60   Pulse 73   Temp 36.7 °C (98 °F)   Ht 1.6 m (5' 3\")   Wt 54 kg (119 lb)   SpO2 98%   BMI 21.08 kg/m²      PHYSICAL EXAM    GENERAL  Well-appearing, pleasant and cooperative.  No acute distress.    HEENT  HEAD:   Normocephalic.  Atraumatic.  EYES:  PERRLA.  No scleral icterus or conjunctival injection.  NECK:  No adenopathy.  No palpable thyroid enlargement or nodules.    THROAT:  Moist oropharynx without tonsillar enlargement or exudates.    LUNGS:    Clear to auscultation bilaterally.  No wheezes, rales, rhonchi.    CARDIAC:  Regular rate and rhythm.  Normal S1S2.  No murmurs/rubs/gallops.    ABDOMEN:  Soft, non-tender, non-distended.  No hepatosplenomegaly.  Normoactive bowel sounds.    MUSCULOSKELETAL:  No gross abnormalities.   No joint swelling or erythema,.  No spinal or paraspinal tenderness to palpation.    EXTREMITIES:  No LE edema or cyanosis.      NEURO           Alert and oriented x3. No focal deficits.    PSYCH:          Affect appropriate.           Assessment/Plan   Problem List Items Addressed This Visit    None  Visit Diagnoses       Low TSH level    -  Primary    Relevant Orders    Referral to Endocrinology    US thyroid    T4    TSH    Vaccine counseling        Relevant Orders    Flu vaccine, trivalent, preservative free, age 6 months and greater (Fluarix/Fluzone/Flulaval) (Completed)    Encounter for immunization        Relevant Orders    Flu vaccine, trivalent, preservative free, age 6 months and greater (Fluarix/Fluzone/Flulaval) (Completed)                 Sylvie Renee MD    "

## 2024-10-07 ENCOUNTER — HOSPITAL ENCOUNTER (OUTPATIENT)
Dept: RADIOLOGY | Facility: HOSPITAL | Age: 24
Discharge: HOME | End: 2024-10-07
Payer: COMMERCIAL

## 2024-10-07 DIAGNOSIS — R79.89 LOW TSH LEVEL: ICD-10-CM

## 2024-10-07 PROCEDURE — 76536 US EXAM OF HEAD AND NECK: CPT

## 2024-10-07 PROCEDURE — 76536 US EXAM OF HEAD AND NECK: CPT | Performed by: STUDENT IN AN ORGANIZED HEALTH CARE EDUCATION/TRAINING PROGRAM

## 2024-10-11 ENCOUNTER — APPOINTMENT (OUTPATIENT)
Dept: PRIMARY CARE | Facility: CLINIC | Age: 24
End: 2024-10-11
Payer: COMMERCIAL

## 2024-10-22 ENCOUNTER — APPOINTMENT (OUTPATIENT)
Dept: GASTROENTEROLOGY | Facility: HOSPITAL | Age: 24
End: 2024-10-22
Payer: COMMERCIAL

## 2024-10-29 ENCOUNTER — APPOINTMENT (OUTPATIENT)
Dept: OTOLARYNGOLOGY | Facility: CLINIC | Age: 24
End: 2024-10-29
Payer: COMMERCIAL

## 2024-11-21 ENCOUNTER — APPOINTMENT (OUTPATIENT)
Dept: OTOLARYNGOLOGY | Facility: CLINIC | Age: 24
End: 2024-11-21
Payer: COMMERCIAL

## 2024-12-24 ENCOUNTER — OFFICE VISIT (OUTPATIENT)
Dept: GASTROENTEROLOGY | Facility: HOSPITAL | Age: 24
End: 2024-12-24
Payer: COMMERCIAL

## 2024-12-24 ENCOUNTER — LAB (OUTPATIENT)
Dept: LAB | Facility: LAB | Age: 24
End: 2024-12-24
Payer: COMMERCIAL

## 2024-12-24 VITALS
SYSTOLIC BLOOD PRESSURE: 108 MMHG | OXYGEN SATURATION: 98 % | HEART RATE: 74 BPM | DIASTOLIC BLOOD PRESSURE: 75 MMHG | WEIGHT: 121.8 LBS | BODY MASS INDEX: 21.58 KG/M2 | TEMPERATURE: 97.7 F

## 2024-12-24 DIAGNOSIS — K92.1 HEMATOCHEZIA: Primary | ICD-10-CM

## 2024-12-24 DIAGNOSIS — R14.0 BLOATING: ICD-10-CM

## 2024-12-24 DIAGNOSIS — R79.89 LOW TSH LEVEL: ICD-10-CM

## 2024-12-24 DIAGNOSIS — K92.1 BLOOD IN STOOL: ICD-10-CM

## 2024-12-24 DIAGNOSIS — K92.1 BLACK STOOL: ICD-10-CM

## 2024-12-24 LAB
GLIADIN PEPTIDE IGA SER IA-ACNC: 1.4 U/ML
T4 SERPL-MCNC: 7.9 UG/DL (ref 4.5–11.1)
TSH SERPL-ACNC: 0.42 MIU/L (ref 0.44–3.98)
TTG IGA SER IA-ACNC: <1 U/ML

## 2024-12-24 PROCEDURE — 83516 IMMUNOASSAY NONANTIBODY: CPT

## 2024-12-24 PROCEDURE — 99205 OFFICE O/P NEW HI 60 MIN: CPT | Performed by: STUDENT IN AN ORGANIZED HEALTH CARE EDUCATION/TRAINING PROGRAM

## 2024-12-24 PROCEDURE — 99215 OFFICE O/P EST HI 40 MIN: CPT | Performed by: STUDENT IN AN ORGANIZED HEALTH CARE EDUCATION/TRAINING PROGRAM

## 2024-12-24 RX ORDER — POLYETHYLENE GLYCOL 3350, SODIUM CHLORIDE, SODIUM BICARBONATE, POTASSIUM CHLORIDE 420; 11.2; 5.72; 1.48 G/4L; G/4L; G/4L; G/4L
4000 POWDER, FOR SOLUTION ORAL ONCE
Qty: 4000 ML | Refills: 0 | Status: SHIPPED | OUTPATIENT
Start: 2024-12-24 | End: 2024-12-24

## 2024-12-24 ASSESSMENT — ENCOUNTER SYMPTOMS
LOSS OF SENSATION IN FEET: 0
DEPRESSION: 0
OCCASIONAL FEELINGS OF UNSTEADINESS: 0

## 2024-12-24 ASSESSMENT — PAIN SCALES - GENERAL: PAINLEVEL_OUTOF10: 0-NO PAIN

## 2024-12-24 NOTE — PROGRESS NOTES
GI new patient visit  12/24/2024    Reason for referral:BRBPR    HPI:   This is a 23 yo F, previously healthy, p/w abdominal pain and occasional, intermittent BRBPR. Reports intermittent episodes of abdominal pain and bloating during the past year that have worsened during the past few months, which is triggered and exacerbated after ingestion of certain types of food, mainly gluten and lactose which caused her to remove them from her diet.    Has 1-2 BM per day, no straining, no diarrhea or constipation. Occasional N/V around pain episodes. Denies heartburn, odynophagia or dysphagia. Denies recent weight loss or decreased appetite. Patient is not on iron supplements. Has regular menses.    Per pt over last 3 mths, she has had multiple episodes of scant to moderate BRBPR- on wiping paper and in toilet bowl not a/w constipation or straining.    Reports multiple allergies, history of dermographism but no history of other rash, no history of angioedema. Has never seen an allergist before.     She has had an EGD in 2021 with duodenal bx neg for celiac.      PMHx/PSHx: chronic sinusitis, multiple allergies reported    SH: quit smoking, occasional alcohol use, no drug use    FH: hx of esophageal cancer in grandfather, no other hx of GI cancers, no FH of IBD    Vitals:  Vitals:    12/24/24 0954   BP: 108/75   Pulse: 74   Temp: 36.5 °C (97.7 °F)   SpO2: 98%     PE:  Alert oriented x3  EOMI  PERRL  HEENT  No jaundice  Lungs clear bilaterally  Heart sounds regular  Abdomen soft, non distended non tender  No LE edema    Labs:   Latest Reference Range & Units 06/20/24 10:22   WBC 4.4 - 11.3 x10*3/uL 5.8   nRBC 0.0 - 0.0 /100 WBCs 0.0   RBC 4.00 - 5.20 x10*6/uL 4.27   HEMOGLOBIN 12.0 - 16.0 g/dL 13.6   HEMATOCRIT 36.0 - 46.0 % 41.3   MCV 80 - 100 fL 97   MCH 26.0 - 34.0 pg 31.9   MCHC 32.0 - 36.0 g/dL 32.9   RED CELL DISTRIBUTION WIDTH 11.5 - 14.5 % 12.3   Platelets 150 - 450 x10*3/uL 214      Latest Reference Range & Units  06/20/24 10:22   GLUCOSE 65 - 99 mg/dL 88   SODIUM 133 - 145 mmol/L 139   POTASSIUM 3.4 - 5.1 mmol/L 4.3   CHLORIDE 97 - 107 mmol/L 105   Bicarbonate 24 - 31 mmol/L 23 (L)   Anion Gap <=19 mmol/L 11   Blood Urea Nitrogen 8 - 25 mg/dL 11   Creatinine 0.40 - 1.60 mg/dL 0.80   EGFR >60 mL/min/1.73m*2 >90   Calcium 8.5 - 10.4 mg/dL 9.2   Albumin 3.5 - 5.0 g/dL 4.5   Alkaline Phosphatase 35 - 125 U/L 45   ALT 5 - 40 U/L 12   AST 5 - 40 U/L 14   Bilirubin Total 0.1 - 1.2 mg/dL <0.2   (L): Data is abnormally low     Latest Reference Range & Units 06/20/24 10:22   Vitamin D, 25-Hydroxy, Total 31 - 100 ng/mL 14 (L)   (L): Data is abnormally low     Latest Reference Range & Units 08/29/23 10:41   FERRITIN 13 - 150 NG/   Total Protein 5.9 - 7.9 G/DL 7.3   IRON 30 - 160 UG/DL 64   CHOLESTEROL 133 - 200 MG/   TIBC 228 - 428 UG/   % Saturation 12 - 50 % 24.2       Imaging:  CT AP 2024:   ABDOMEN - PELVIS  1.  Things no acute abdominal or pelvic findings.  2. Probable involuting right ovarian cyst. Minimal amount of dependent pelvic free fluid possibly physiologic in a patient this age.    GI procedures:    Reports colonoscopy at age 17 (?), no report available    EGD 2021:  Findings: The examined esophagus was normal. Four biopsies were obtained with cold forceps for histology in the lower third of the esophagus, as well as  two biopsies in the middle third of the esophagus. Patchy mild inflammation characterized by erosions and erythema was found on the greater curvature of the stomach and at the pylorus. Biopsies were taken with a cold forceps for histology. The examined duodenum was normal. Two biopsies were obtained in the second portion of the duodenum with cold forceps for histology.  Impression:  - Normal esophagus.  - Gastritis. Biopsied.  - Normal examined duodenum.  - Biopsies performed in the lower third of the esophagus and in the middle third of the esophagus.  - Two biopsies were obtained in the  second portion of the duodenum.    A&P:  This is a 23 yo F, previously healthy, p/w abdominal pain and occasional, intermittent BRBPR. Reports intermittent episodes of abdominal pain and bloating during the past year that have worsened during the past few months, which is triggered and exacerbated after ingestion of certain types of food, mainly gluten and lactose which caused her to remove them from her diet.    Per pt over last 3 mths, she has had multiple episodes of scant to moderate BRBPR- on wiping paper and in toilet bowl not a/w constipation or straining.    Reports multiple allergies, history of dermographism but no history of other rash, no history of angioedema. Has never seen an allergist before.     She has had an EGD in 2021 with duodenal bx neg for celiac. 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    Ddx of BRBPR: Anorectal (hemorrhoids, anal fissures, rectal ulcers), Neoplasia (polyps and cancers), Angiodysplasia, Diverticulosis, Ischemia.    Plan:  -we will order colonoscopy for evaluation of blood in stool (please call  to schedule your procedure)  -please obtain celiac serologies (lab test)  -may continue to avoid gluten for likely non celiac gluten sensitivity.  -Recommend referral to allergist since patient reports history of multiple allergies    RTC in 6 months    Susana Gonsalez MD  PGY-2 Internal Medicine     ---I edited, and made addenda to this resident note----     Princess Rutherford MD

## 2024-12-24 NOTE — PATIENT INSTRUCTIONS
Thank you for coming to GI clinic today, we will:    -we will order colonoscopy for evaluation of blood in stool (please call  to schedule your procedure)  -please obtain celiac serologies (lab test)    We will see you back in 4 months

## 2024-12-27 LAB
GLIADIN PEPTIDE IGG SER IA-ACNC: <0.56 FLU (ref 0–4.99)
TTG IGG SER IA-ACNC: <0.82 FLU (ref 0–4.99)

## 2025-01-11 ENCOUNTER — HOSPITAL ENCOUNTER (EMERGENCY)
Facility: HOSPITAL | Age: 25
Discharge: HOME | End: 2025-01-11
Attending: STUDENT IN AN ORGANIZED HEALTH CARE EDUCATION/TRAINING PROGRAM
Payer: COMMERCIAL

## 2025-01-11 VITALS
RESPIRATION RATE: 18 BRPM | DIASTOLIC BLOOD PRESSURE: 81 MMHG | TEMPERATURE: 97.8 F | SYSTOLIC BLOOD PRESSURE: 132 MMHG | OXYGEN SATURATION: 100 % | HEART RATE: 86 BPM

## 2025-01-11 DIAGNOSIS — K92.2 GASTROINTESTINAL HEMORRHAGE, UNSPECIFIED GASTROINTESTINAL HEMORRHAGE TYPE: ICD-10-CM

## 2025-01-11 DIAGNOSIS — R10.9 ABDOMINAL PAIN, UNSPECIFIED ABDOMINAL LOCATION: Primary | ICD-10-CM

## 2025-01-11 LAB
ALBUMIN SERPL BCP-MCNC: 4.7 G/DL (ref 3.4–5)
ALP SERPL-CCNC: 47 U/L (ref 33–110)
ALT SERPL W P-5'-P-CCNC: 8 U/L (ref 7–45)
ANION GAP SERPL CALC-SCNC: 12 MMOL/L (ref 10–20)
AST SERPL W P-5'-P-CCNC: 11 U/L (ref 9–39)
BASOPHILS # BLD AUTO: 0.06 X10*3/UL (ref 0–0.1)
BASOPHILS NFR BLD AUTO: 0.8 %
BILIRUB SERPL-MCNC: 0.4 MG/DL (ref 0–1.2)
BUN SERPL-MCNC: 10 MG/DL (ref 6–23)
CALCIUM SERPL-MCNC: 9.6 MG/DL (ref 8.6–10.6)
CHLORIDE SERPL-SCNC: 102 MMOL/L (ref 98–107)
CO2 SERPL-SCNC: 29 MMOL/L (ref 21–32)
CREAT SERPL-MCNC: 0.73 MG/DL (ref 0.5–1.05)
EGFRCR SERPLBLD CKD-EPI 2021: >90 ML/MIN/1.73M*2
EOSINOPHIL # BLD AUTO: 0.25 X10*3/UL (ref 0–0.7)
EOSINOPHIL NFR BLD AUTO: 3.1 %
ERYTHROCYTE [DISTWIDTH] IN BLOOD BY AUTOMATED COUNT: 12 % (ref 11.5–14.5)
GLUCOSE SERPL-MCNC: 83 MG/DL (ref 74–99)
HCT VFR BLD AUTO: 41.2 % (ref 36–46)
HGB BLD-MCNC: 14 G/DL (ref 12–16)
HOLD SPECIMEN: NORMAL
HOLD SPECIMEN: NORMAL
IMM GRANULOCYTES # BLD AUTO: 0.02 X10*3/UL (ref 0–0.7)
IMM GRANULOCYTES NFR BLD AUTO: 0.3 % (ref 0–0.9)
LYMPHOCYTES # BLD AUTO: 2.01 X10*3/UL (ref 1.2–4.8)
LYMPHOCYTES NFR BLD AUTO: 25.3 %
MAGNESIUM SERPL-MCNC: 2.14 MG/DL (ref 1.6–2.4)
MCH RBC QN AUTO: 31.1 PG (ref 26–34)
MCHC RBC AUTO-ENTMCNC: 34 G/DL (ref 32–36)
MCV RBC AUTO: 92 FL (ref 80–100)
MONOCYTES # BLD AUTO: 0.72 X10*3/UL (ref 0.1–1)
MONOCYTES NFR BLD AUTO: 9.1 %
NEUTROPHILS # BLD AUTO: 4.89 X10*3/UL (ref 1.2–7.7)
NEUTROPHILS NFR BLD AUTO: 61.4 %
NRBC BLD-RTO: 0 /100 WBCS (ref 0–0)
PHOSPHATE SERPL-MCNC: 3 MG/DL (ref 2.5–4.9)
PLATELET # BLD AUTO: 216 X10*3/UL (ref 150–450)
POTASSIUM SERPL-SCNC: 3.5 MMOL/L (ref 3.5–5.3)
PREGNANCY TEST URINE, POC: NEGATIVE
PROT SERPL-MCNC: 7.3 G/DL (ref 6.4–8.2)
RBC # BLD AUTO: 4.5 X10*6/UL (ref 4–5.2)
SODIUM SERPL-SCNC: 139 MMOL/L (ref 136–145)
WBC # BLD AUTO: 8 X10*3/UL (ref 4.4–11.3)

## 2025-01-11 PROCEDURE — 84075 ASSAY ALKALINE PHOSPHATASE: CPT

## 2025-01-11 PROCEDURE — 2500000001 HC RX 250 WO HCPCS SELF ADMINISTERED DRUGS (ALT 637 FOR MEDICARE OP)

## 2025-01-11 PROCEDURE — 36415 COLL VENOUS BLD VENIPUNCTURE: CPT

## 2025-01-11 PROCEDURE — 99284 EMERGENCY DEPT VISIT MOD MDM: CPT | Performed by: STUDENT IN AN ORGANIZED HEALTH CARE EDUCATION/TRAINING PROGRAM

## 2025-01-11 PROCEDURE — 85025 COMPLETE CBC W/AUTO DIFF WBC: CPT

## 2025-01-11 PROCEDURE — 2500000004 HC RX 250 GENERAL PHARMACY W/ HCPCS (ALT 636 FOR OP/ED)

## 2025-01-11 PROCEDURE — 83735 ASSAY OF MAGNESIUM: CPT

## 2025-01-11 PROCEDURE — 84100 ASSAY OF PHOSPHORUS: CPT

## 2025-01-11 PROCEDURE — 81025 URINE PREGNANCY TEST: CPT

## 2025-01-11 PROCEDURE — 80053 COMPREHEN METABOLIC PANEL: CPT

## 2025-01-11 PROCEDURE — 99283 EMERGENCY DEPT VISIT LOW MDM: CPT | Performed by: STUDENT IN AN ORGANIZED HEALTH CARE EDUCATION/TRAINING PROGRAM

## 2025-01-11 RX ORDER — DICYCLOMINE HYDROCHLORIDE 20 MG/1
20 TABLET ORAL 2 TIMES DAILY PRN
Qty: 20 TABLET | Refills: 0 | Status: SHIPPED | OUTPATIENT
Start: 2025-01-11 | End: 2025-01-21

## 2025-01-11 RX ORDER — ACETAMINOPHEN 325 MG/1
650 TABLET ORAL ONCE
Status: COMPLETED | OUTPATIENT
Start: 2025-01-11 | End: 2025-01-11

## 2025-01-11 RX ORDER — DICYCLOMINE HYDROCHLORIDE 10 MG/1
10 CAPSULE ORAL ONCE
Status: COMPLETED | OUTPATIENT
Start: 2025-01-11 | End: 2025-01-11

## 2025-01-11 RX ORDER — ONDANSETRON 4 MG/1
4 TABLET, ORALLY DISINTEGRATING ORAL ONCE
Status: COMPLETED | OUTPATIENT
Start: 2025-01-11 | End: 2025-01-11

## 2025-01-11 RX ORDER — ONDANSETRON 4 MG/1
4 TABLET, FILM COATED ORAL EVERY 8 HOURS PRN
Qty: 15 TABLET | Refills: 0 | Status: SHIPPED | OUTPATIENT
Start: 2025-01-11 | End: 2025-01-14

## 2025-01-11 RX ORDER — DEXTROMETHORPHAN HYDROBROMIDE, GUAIFENESIN 5; 100 MG/5ML; MG/5ML
650 LIQUID ORAL EVERY 8 HOURS PRN
Qty: 30 TABLET | Refills: 0 | Status: SHIPPED | OUTPATIENT
Start: 2025-01-11 | End: 2025-01-21

## 2025-01-11 RX ADMIN — ONDANSETRON 4 MG: 4 TABLET, ORALLY DISINTEGRATING ORAL at 12:40

## 2025-01-11 RX ADMIN — DICYCLOMINE HYDROCHLORIDE 10 MG: 10 CAPSULE ORAL at 12:40

## 2025-01-11 RX ADMIN — ACETAMINOPHEN 650 MG: 325 TABLET ORAL at 12:40

## 2025-01-11 ASSESSMENT — PAIN DESCRIPTION - LOCATION
LOCATION: ABDOMEN
LOCATION: ABDOMEN

## 2025-01-11 ASSESSMENT — PAIN DESCRIPTION - PROGRESSION: CLINICAL_PROGRESSION: GRADUALLY IMPROVING

## 2025-01-11 ASSESSMENT — PAIN SCALES - GENERAL
PAINLEVEL_OUTOF10: 0 - NO PAIN
PAINLEVEL_OUTOF10: 7
PAINLEVEL_OUTOF10: 7

## 2025-01-11 ASSESSMENT — PAIN - FUNCTIONAL ASSESSMENT: PAIN_FUNCTIONAL_ASSESSMENT: 0-10

## 2025-01-11 NOTE — ED TRIAGE NOTES
Hx of IBS, and anal fissures. For the last week patient has been having bleeding that is both bright red and black when mixed with stool. C/O ABD pain 7/10. Patient also states she is not eating well due to pain with nausea after taking small bites. Patient has a GI appointment in March but is unable to wait to be seen due to persistent symptoms.

## 2025-01-11 NOTE — DISCHARGE INSTRUCTIONS
Please follow up with your Gastroenterologist as soon as you are able to discuss further recommendations for your symptoms.   If symptoms significantly worsen please return to the ED.

## 2025-01-11 NOTE — ED PROVIDER NOTES
Emergency Department Provider Note        History of Present Illness     History provided by: Patient  Limitations to History: None  External Records Reviewed with Brief Summary: None    HPI:  Dayami Tafoya is a 24 y.o. female with past medical history of IBS, anal fissures, POTS, hypercoagulable disorder presenting today for 6 days of worsening GI bleeds and abdominal pain.  Patient states for the last 6 days she has had multiple episodes a day of black to bright red blood per rectum.  She endorses abdominal pain diffusely greater in the lower quadrants.  Endorses nausea but no vomiting.  Patient additionally endorses night sweats, fever/chills, decreased appetite and weight loss.  Patient is due for colonoscopy in March but states she does not believe she could wait that long.  Patient denies any diarrhea or constipation, denies menstrual cycle changes, denies shortness of breath/chest pain.  Patient has not attempted any at home medications for her symptoms.    Physical Exam   Triage vitals:  T 36.6 °C (97.8 °F)  HR 86  /81  RR 18  O2 100 % None (Room air)    General: Awake, alert, in no acute distress  Eyes: Gaze conjugate.  No scleral icterus or injection  HENT: Normo-cephalic, atraumatic. No stridor  CV: Regular rate, regular rhythm.   Resp: Breathing non-labored, speaking in full sentences.  Clear to auscultation bilaterally  GI: Soft, non-distended, mildly tender greatest in left lower quadrant. No rebound or guarding.  MSK/Extremities: No gross bony deformities. Moving all extremities  Skin: Warm. Appropriate color  Neuro: Alert. Oriented. Face symmetric. Speech is fluent.  Gross strength and sensation intact in b/l UE and LEs  Psych: Appropriate mood and affect  Rectal: No external fissures or hemorrhoids noted, no active bleeding per rectum, no arely hematochezia or melena noted on exam.    Medical Decision Making & ED Course   Medical Decision Makin y.o. female with past medical  history of IBS, anal fissures, POTS, hypercoagulable disorder presenting today for 6 days of worsening GI bleeds and abdominal pain.  Upon initial evaluation patient is well-appearing with reassuring vital signs.  Differentials at this time include IBS, IBD, PUD, hemorrhoids, anal fissure.  Imaging unlikely to be helpful at this time for these diagnoses.  With the patient's stated notable increase in blood production there is also concern for possible anemia and malnutrition.  Will obtain labs to further evaluate.  CBC, CMP, mag, Phos were ordered.  Patient was provided Tylenol, Bentyl, Zofran for her symptoms.  Upon reevaluation patient reports improvement of her symptoms.  Lab results showed no evidence of anemia, no elevated white count, no evidence of malnutrition.  Patient was informed of her results.  Patient was recommended to follow-up with her gastroenterologist as soon as she is able, and informed that her already scheduled colonoscopy will likely provide her the most information going forward.  Patient discharged home in stable condition and was given appropriate return precautions including worsening abdominal pain, bleeding.    ----    Differential diagnoses considered include but are not limited to: IBS, IBD, PUD, malignancy, hemorrhoids, anal fissure     Social Determinants of Health which Significantly Impact Care: None identified     EKG Independent Interpretation: EKG not obtained    Independent Result Review and Interpretation: None obtained    Chronic conditions affecting the patient's care: As documented above in [facility]    The patient was discussed with the following consultants/services: None    Care Considerations: As documented above in [facility]    ED Course:  ED Course as of 01/11/25 1347   Sat Jan 11, 2025   1237 ED Attending Attestation: 24-year-old female with history of POTS, IBS presenting to the emergency department with intermittent abdominal pain, bright red blood per rectum, dark tarry  stools.  Has had the symptoms chronically, saw GI on 12/24 and I reviewed the GI note.  They recommended colonoscopy for IBD workup.  They also obtained celiac labs on 12/24 which I reviewed and were normal.  She has had a CT abdomen pelvis done in May 2024 and a colonoscopy done in 2021 which were both unremarkable.  She does not have any focal tenderness on abdominal examination, she is not Peritonitic.  Her vital signs are stable.  Will obtain blood work to assess for any electrolyte abnormalities or anemia.  Overall, presentation seems concerning for IBD versus IBS, lower concern for infectious or malignant etiology.  Do not suspect she is having a acute GI bleed requiring hospitalization or transfusion given the chronicity of her symptoms and her stable vital signs.  Will plan to discharge after treating symptomatically. [SH]      ED Course User Index  [SH] Miguel Ángel Schroeder MD         Diagnoses as of 01/11/25 1347   Abdominal pain, unspecified abdominal location   Gastrointestinal hemorrhage, unspecified gastrointestinal hemorrhage type     Disposition   As a result of the work-up, the patient was discharged home.  she was informed of her diagnosis and instructed to come back with any concerns or worsening of condition.  she and was agreeable to the plan as discussed above.  she was given the opportunity to ask questions.  All of the patient's questions were answered.    Procedures   Procedures    Patient seen and discussed with ED attending physician.    Isidro Appiah DO  Emergency Medicine       Isidro Appiah DO  Resident  01/11/25 0419

## 2025-01-13 NOTE — PROGRESS NOTES
Subjective   Patient ID:   Dayami Tafoya is a 24 y.o. female who presents for No chief complaint on file..  HPI  Dr. Renee patient.    Hospital follow up:  Was in the ED on 1/11/25.  ED note reviewed.  Was having abdominal pain and blood in stool.  Was given Bentyl and Zofran.  She does have history of IBS.  Will be following up with GI.  Will be having a colonoscopy in March.    Review of Systems  12 point review of systems negative unless stated above in HPI    There were no vitals filed for this visit.    Physical Exam  General: Alert and oriented, well nourished, no acute distress.  Lungs: Clear to auscultation, non-labored respiration.  Heart: Normal rate, regular rhythm, no murmur, gallop or edema.  Neurologic: Awake, alert, and oriented X3, CN II-XII intact.  Psychiatric: Cooperative, appropriate mood and affect.    Assessment/Plan   Diagnoses and all orders for this visit:  Generalized abdominal pain  Irritable bowel syndrome, unspecified type  Hematochezia

## 2025-01-21 ENCOUNTER — APPOINTMENT (OUTPATIENT)
Dept: PRIMARY CARE | Facility: CLINIC | Age: 25
End: 2025-01-21
Payer: COMMERCIAL

## 2025-01-21 DIAGNOSIS — R10.84 GENERALIZED ABDOMINAL PAIN: Primary | ICD-10-CM

## 2025-01-21 DIAGNOSIS — K58.9 IRRITABLE BOWEL SYNDROME, UNSPECIFIED TYPE: ICD-10-CM

## 2025-01-21 DIAGNOSIS — K92.1 HEMATOCHEZIA: ICD-10-CM

## 2025-01-29 NOTE — PATIENT INSTRUCTIONS
Thank you for coming to see me today.    Your rapid mono and strep testing were negative today.  We will send strep culture to the lab for further confirmation as we discussed.    Antibiotic (Augmentin) sent to pharmacy, take twice daily for 10 days as prescribed.    Go to radiology for chest x-ray.    Keep GI appointment for endoscopy as scheduled on 3/18/2025.    I continue to recommend endocrinology follow-up for continuing abnormal thyroid labs.  Referral reentered today, call to schedule.    Follow up with me for [facility] on 6/12/2024 or shortly thereafter, sooner if needed or if current symptoms do not resolve with treatment as planned.

## 2025-01-29 NOTE — PROGRESS NOTES
Subjective   Dayami Tafoya is a 24 y.o. female who presents for Follow-up (Pt stating she has had a cough for about 2 weeks ago also stating she has a sore throat pt stating she has nasal drainage that is green and brown. Pt denies any fevers but really has not been able to check).    HPI:      This is a 22-year-old female presenting for ED follow-up.  Seen at [facility] on 1/11/2025.  Last seen by me on 9/25/2024 for TSH labs, last CPE 6/12/2024.    ED Attending Attestation: 24-year-old female with history of POTS, IBS presenting to the emergency department with intermittent abdominal pain, bright red blood per rectum, dark tarry stools.  Has had the symptoms chronically, saw GI on 12/24 and I reviewed the GI note.  They recommended colonoscopy for IBD workup.  They also obtained celiac labs on 12/24 which I reviewed and were normal.  She has had a CT abdomen pelvis done in May 2024 and a colonoscopy done in 2021 which were both unremarkable.  She does not have any focal tenderness on abdominal examination, she is not Peritonitic.  Her vital signs are stable.  Will obtain blood work to assess for any electrolyte abnormalities or anemia.  Overall, presentation seems concerning for IBD versus IBS, lower concern for infectious or malignant etiology.  Do not suspect she is having a acute GI bleed requiring hospitalization or transfusion given the chronicity of her symptoms and her stable vital signs.  Will plan to discharge after treating symptomatically. [SH]     Follows with GI (Dr. Rutherford).  Endoscopy appt scheduled for 3/18/2025 and GI appt also scheduled on 5/13/2025.  Symptoms stable, no new concerns today and aware of upcoming GI follow-up/scope.    Low TSH:  Referral to endo entered last year.  Thyroid US WNL in 10/2024, labs in 12/2024 showing ongoing low TSH, normal T4.    Lab Results   Component Value Date    TSH 0.42 (L) 12/24/2024     Component      Latest Ref Rng 12/24/2024    Thyroxine      4.5 - 11.1 ug/dL 7.9      Sinus Congestion:  Ongoing for 2 weeks.  Sinus congestion, cough, sore throat.  Some subjective chills, but does not have a working thermometer.  Hx of asthma, using PRN albuterol.  Other coworkers sick with pneumonia.    ROS:   Review of systems is essentially negative for all systems except for any identified issues in HPI above.    Objective     /68   Pulse 76   Temp 36.6 °C (97.9 °F)   Wt 54.4 kg (120 lb)   LMP 01/01/2025   SpO2 99%   BMI 21.26 kg/m²      PHYSICAL EXAM    GENERAL  Well-appearing, pleasant and cooperative.  No acute distress.    HEENT  HEAD:   Normocephalic.  Atraumatic.  EYES:  PERRLA.  No scleral icterus or conjunctival injection.  EARS:  Tympanic membranes visualized bilaterally without erythema, fluid, or bulging.  NECK:  No adenopathy.  No palpable thyroid enlargement or nodules.    THROAT:  Moist oropharynx 1+ bilateral tonsillar enlargement, mild anterior cervical LAD.     LUNGS:    Clear to auscultation bilaterally.  No wheezes, rales, rhonchi.    CARDIAC:  Regular rate and rhythm.  Normal S1S2.  No murmurs/rubs/gallops.    ABDOMEN:  Soft, non-tender, non-distended.  No hepatosplenomegaly.  Normoactive bowel sounds.    MUSCULOSKELETAL:  No gross abnormalities.       EXTREMITIES:  No LE edema or cyanosis.      NEURO           Alert and oriented x3. No focal deficits.    PSYCH:          Affect appropriate.           Assessment/Plan   Problem List Items Addressed This Visit    None  Visit Diagnoses       Generalized abdominal pain    -  Primary    Stable, upcoming scope and GI appointments scheduled.  Nonacute abdominal exam in clinic today.    Blood in stool        Endoscopy schedule with GI on 3/18/2025.    Low TSH level        Relevant Orders    Referral to Endocrinology    Subacute sinusitis, unspecified location        Relevant Medications    amoxicillin-pot clavulanate (Augmentin) 875-125 mg tablet    Subacute cough         Suspected sinusitis and postnasal drip, patient agreeable to chest x-ray to rule out pneumonia as well.  Ordered today.    Relevant Medications    amoxicillin-pot clavulanate (Augmentin) 875-125 mg tablet    Other Relevant Orders    XR chest 2 views    Sore throat        Rapid strep and Monospot negative in clinic today.  Suspect bacterial sinusitis and postnasal drip, Augmentin prescribed today.    Relevant Medications    amoxicillin-pot clavulanate (Augmentin) 875-125 mg tablet    Other Relevant Orders    POCT Rapid Strep A manually resulted (Completed)    POCT Infectious Mononucleosis Antibody manually resulted (Completed)    Group A Streptococcus, Culture          Counseling:   Medication education:    Education: The patient is counseled regarding potential side effects of all new medications.    Understanding:  Patient expressed understanding.    Adherence:  No barriers to adherence identified.         Sylvie Renee MD

## 2025-02-03 ENCOUNTER — OFFICE VISIT (OUTPATIENT)
Dept: PRIMARY CARE | Facility: CLINIC | Age: 25
End: 2025-02-03
Payer: COMMERCIAL

## 2025-02-03 VITALS
OXYGEN SATURATION: 99 % | DIASTOLIC BLOOD PRESSURE: 68 MMHG | TEMPERATURE: 97.9 F | WEIGHT: 120 LBS | BODY MASS INDEX: 21.26 KG/M2 | SYSTOLIC BLOOD PRESSURE: 108 MMHG | HEART RATE: 76 BPM

## 2025-02-03 DIAGNOSIS — J01.90 SUBACUTE SINUSITIS, UNSPECIFIED LOCATION: ICD-10-CM

## 2025-02-03 DIAGNOSIS — R10.84 GENERALIZED ABDOMINAL PAIN: Primary | ICD-10-CM

## 2025-02-03 DIAGNOSIS — R05.2 SUBACUTE COUGH: ICD-10-CM

## 2025-02-03 DIAGNOSIS — K92.1 BLOOD IN STOOL: ICD-10-CM

## 2025-02-03 DIAGNOSIS — R79.89 LOW TSH LEVEL: ICD-10-CM

## 2025-02-03 DIAGNOSIS — J02.9 SORE THROAT: ICD-10-CM

## 2025-02-03 LAB
POC RAPID MONO: NEGATIVE
POC RAPID STREP: NEGATIVE

## 2025-02-03 PROCEDURE — 87880 STREP A ASSAY W/OPTIC: CPT | Performed by: STUDENT IN AN ORGANIZED HEALTH CARE EDUCATION/TRAINING PROGRAM

## 2025-02-03 PROCEDURE — 99214 OFFICE O/P EST MOD 30 MIN: CPT | Performed by: STUDENT IN AN ORGANIZED HEALTH CARE EDUCATION/TRAINING PROGRAM

## 2025-02-03 PROCEDURE — 4004F PT TOBACCO SCREEN RCVD TLK: CPT | Performed by: STUDENT IN AN ORGANIZED HEALTH CARE EDUCATION/TRAINING PROGRAM

## 2025-02-03 PROCEDURE — 86308 HETEROPHILE ANTIBODY SCREEN: CPT | Performed by: STUDENT IN AN ORGANIZED HEALTH CARE EDUCATION/TRAINING PROGRAM

## 2025-02-03 RX ORDER — AMOXICILLIN AND CLAVULANATE POTASSIUM 875; 125 MG/1; MG/1
875 TABLET, FILM COATED ORAL 2 TIMES DAILY
Qty: 20 TABLET | Refills: 0 | Status: CANCELLED | OUTPATIENT
Start: 2025-02-03 | End: 2025-02-13

## 2025-02-03 RX ORDER — AMOXICILLIN AND CLAVULANATE POTASSIUM 875; 125 MG/1; MG/1
875 TABLET, FILM COATED ORAL 2 TIMES DAILY
Qty: 20 TABLET | Refills: 0 | Status: SHIPPED | OUTPATIENT
Start: 2025-02-03 | End: 2025-02-13

## 2025-02-03 ASSESSMENT — PAIN SCALES - GENERAL: PAINLEVEL_OUTOF10: 7

## 2025-02-05 LAB — S PYO THROAT QL CULT: NORMAL

## 2025-02-24 ENCOUNTER — APPOINTMENT (OUTPATIENT)
Dept: PRIMARY CARE | Facility: CLINIC | Age: 25
End: 2025-02-24
Payer: COMMERCIAL

## 2025-03-10 ENCOUNTER — TELEPHONE (OUTPATIENT)
Dept: GASTROENTEROLOGY | Facility: HOSPITAL | Age: 25
End: 2025-03-10
Payer: COMMERCIAL

## 2025-03-10 DIAGNOSIS — K92.1 HEMATOCHEZIA: Primary | ICD-10-CM

## 2025-03-10 RX ORDER — POLYETHYLENE GLYCOL 3350, SODIUM CHLORIDE, SODIUM BICARBONATE, POTASSIUM CHLORIDE 420; 11.2; 5.72; 1.48 G/4L; G/4L; G/4L; G/4L
4000 POWDER, FOR SOLUTION ORAL ONCE
Qty: 4000 ML | Refills: 0 | Status: SHIPPED | OUTPATIENT
Start: 2025-03-10 | End: 2025-03-10

## 2025-03-18 ENCOUNTER — ANESTHESIA (OUTPATIENT)
Dept: GASTROENTEROLOGY | Facility: HOSPITAL | Age: 25
End: 2025-03-18
Payer: COMMERCIAL

## 2025-03-18 ENCOUNTER — APPOINTMENT (OUTPATIENT)
Dept: GASTROENTEROLOGY | Facility: HOSPITAL | Age: 25
End: 2025-03-18
Payer: COMMERCIAL

## 2025-03-18 ENCOUNTER — HOSPITAL ENCOUNTER (OUTPATIENT)
Dept: GASTROENTEROLOGY | Facility: HOSPITAL | Age: 25
Discharge: HOME | End: 2025-03-18
Payer: COMMERCIAL

## 2025-03-18 ENCOUNTER — ANESTHESIA EVENT (OUTPATIENT)
Dept: GASTROENTEROLOGY | Facility: HOSPITAL | Age: 25
End: 2025-03-18
Payer: COMMERCIAL

## 2025-03-18 VITALS
SYSTOLIC BLOOD PRESSURE: 112 MMHG | BODY MASS INDEX: 20.73 KG/M2 | HEART RATE: 57 BPM | WEIGHT: 117 LBS | OXYGEN SATURATION: 99 % | HEIGHT: 63 IN | RESPIRATION RATE: 20 BRPM | DIASTOLIC BLOOD PRESSURE: 70 MMHG | TEMPERATURE: 97 F

## 2025-03-18 DIAGNOSIS — K92.1 HEMATOCHEZIA: Primary | ICD-10-CM

## 2025-03-18 PROBLEM — J45.909 MILD ASTHMA (HHS-HCC): Status: ACTIVE | Noted: 2025-03-18

## 2025-03-18 LAB — PREGNANCY TEST URINE, POC: NEGATIVE

## 2025-03-18 PROCEDURE — 3700000001 HC GENERAL ANESTHESIA TIME - INITIAL BASE CHARGE

## 2025-03-18 PROCEDURE — 45385 COLONOSCOPY W/LESION REMOVAL: CPT | Performed by: STUDENT IN AN ORGANIZED HEALTH CARE EDUCATION/TRAINING PROGRAM

## 2025-03-18 PROCEDURE — 7100000010 HC PHASE TWO TIME - EACH INCREMENTAL 1 MINUTE

## 2025-03-18 PROCEDURE — A45385 PR COLONOSCOPY,REMV LESN,SNARE: Performed by: ANESTHESIOLOGY

## 2025-03-18 PROCEDURE — 7100000009 HC PHASE TWO TIME - INITIAL BASE CHARGE

## 2025-03-18 PROCEDURE — 2500000004 HC RX 250 GENERAL PHARMACY W/ HCPCS (ALT 636 FOR OP/ED)

## 2025-03-18 PROCEDURE — A45385 PR COLONOSCOPY,REMV LESN,SNARE

## 2025-03-18 PROCEDURE — 3700000002 HC GENERAL ANESTHESIA TIME - EACH INCREMENTAL 1 MINUTE

## 2025-03-18 PROCEDURE — 2720000007 HC OR 272 NO HCPCS

## 2025-03-18 RX ORDER — ONDANSETRON HYDROCHLORIDE 2 MG/ML
INJECTION, SOLUTION INTRAVENOUS AS NEEDED
Status: DISCONTINUED | OUTPATIENT
Start: 2025-03-18 | End: 2025-03-18

## 2025-03-18 RX ORDER — HYDROMORPHONE HYDROCHLORIDE 1 MG/ML
0.2 INJECTION, SOLUTION INTRAMUSCULAR; INTRAVENOUS; SUBCUTANEOUS EVERY 5 MIN PRN
Status: DISCONTINUED | OUTPATIENT
Start: 2025-03-18 | End: 2025-03-19 | Stop reason: HOSPADM

## 2025-03-18 RX ORDER — ESCITALOPRAM OXALATE 10 MG/1
10 TABLET ORAL DAILY
COMMUNITY

## 2025-03-18 RX ORDER — HYDROMORPHONE HYDROCHLORIDE 1 MG/ML
0.5 INJECTION, SOLUTION INTRAMUSCULAR; INTRAVENOUS; SUBCUTANEOUS EVERY 5 MIN PRN
Status: DISCONTINUED | OUTPATIENT
Start: 2025-03-18 | End: 2025-03-19 | Stop reason: HOSPADM

## 2025-03-18 RX ORDER — ONDANSETRON HYDROCHLORIDE 2 MG/ML
4 INJECTION, SOLUTION INTRAVENOUS ONCE AS NEEDED
Status: DISCONTINUED | OUTPATIENT
Start: 2025-03-18 | End: 2025-03-19 | Stop reason: HOSPADM

## 2025-03-18 RX ORDER — LIDOCAINE HCL/PF 100 MG/5ML
SYRINGE (ML) INTRAVENOUS AS NEEDED
Status: DISCONTINUED | OUTPATIENT
Start: 2025-03-18 | End: 2025-03-18

## 2025-03-18 RX ORDER — SODIUM CHLORIDE, SODIUM LACTATE, POTASSIUM CHLORIDE, CALCIUM CHLORIDE 600; 310; 30; 20 MG/100ML; MG/100ML; MG/100ML; MG/100ML
100 INJECTION, SOLUTION INTRAVENOUS CONTINUOUS
Status: SHIPPED | OUTPATIENT
Start: 2025-03-18 | End: 2025-03-18

## 2025-03-18 RX ORDER — MIDAZOLAM HYDROCHLORIDE 1 MG/ML
INJECTION INTRAMUSCULAR; INTRAVENOUS AS NEEDED
Status: DISCONTINUED | OUTPATIENT
Start: 2025-03-18 | End: 2025-03-18

## 2025-03-18 RX ORDER — PROPOFOL 10 MG/ML
INJECTION, EMULSION INTRAVENOUS AS NEEDED
Status: DISCONTINUED | OUTPATIENT
Start: 2025-03-18 | End: 2025-03-18

## 2025-03-18 RX ADMIN — PROPOFOL 150 MCG/KG/MIN: 10 INJECTION, EMULSION INTRAVENOUS at 08:00

## 2025-03-18 RX ADMIN — SODIUM CHLORIDE, SODIUM LACTATE, POTASSIUM CHLORIDE, AND CALCIUM CHLORIDE: 600; 310; 30; 20 INJECTION, SOLUTION INTRAVENOUS at 07:54

## 2025-03-18 RX ADMIN — ONDANSETRON 4 MG: 2 INJECTION INTRAMUSCULAR; INTRAVENOUS at 08:00

## 2025-03-18 RX ADMIN — MIDAZOLAM HYDROCHLORIDE 2 MG: 2 INJECTION, SOLUTION INTRAMUSCULAR; INTRAVENOUS at 07:54

## 2025-03-18 RX ADMIN — PROPOFOL 40 MG: 10 INJECTION, EMULSION INTRAVENOUS at 07:59

## 2025-03-18 RX ADMIN — LIDOCAINE HYDROCHLORIDE 80 MG: 20 INJECTION INTRAVENOUS at 07:59

## 2025-03-18 SDOH — HEALTH STABILITY: MENTAL HEALTH: CURRENT SMOKER: 0

## 2025-03-18 ASSESSMENT — PAIN SCALES - GENERAL
PAINLEVEL_OUTOF10: 0 - NO PAIN
PAIN_LEVEL: 0
PAINLEVEL_OUTOF10: 0 - NO PAIN
PAINLEVEL_OUTOF10: 0 - NO PAIN

## 2025-03-18 ASSESSMENT — PAIN - FUNCTIONAL ASSESSMENT
PAIN_FUNCTIONAL_ASSESSMENT: 0-10
PAIN_FUNCTIONAL_ASSESSMENT: WONG-BAKER FACES

## 2025-03-18 ASSESSMENT — PAIN SCALES - WONG BAKER: WONGBAKER_NUMERICALRESPONSE: NO HURT

## 2025-03-18 NOTE — ANESTHESIA POSTPROCEDURE EVALUATION
Patient: Dayami Tafoya    Procedure Summary       Date: 03/18/25 Room / Location: [facility]    Anesthesia Start: 0754 Anesthesia Stop: 0833    Procedure: COLONOSCOPY Diagnosis:       Hematochezia      Hematochezia    Scheduled Providers: Princess Rutherford MD Responsible Provider: Adi Robles MD    Anesthesia Type: MAC ASA Status: 2            Anesthesia Type: MAC    Vitals Value Taken Time   /70 03/18/25 0900   Temp 36.1 °C (97 °F) 03/18/25 0830   Pulse 57 03/18/25 0900   Resp 20 03/18/25 0900   SpO2 99 % 03/18/25 0900       Anesthesia Post Evaluation    Patient location during evaluation: PACU  Patient participation: complete - patient participated  Level of consciousness: awake  Pain score: 0  Pain management: adequate  Airway patency: patent  Cardiovascular status: acceptable  Respiratory status: acceptable  Hydration status: acceptable  Postoperative Nausea and Vomiting: none    There were no known notable events for this encounter.

## 2025-03-18 NOTE — H&P
"History Of Present Illness  Dayami Tafoya is a 24 y.o. female presenting with BRBPR.     Past Medical History  Past Medical History:   Diagnosis Date    Anal fissure     Asthma     History of hypercoagulable state     IBS (irritable bowel syndrome)     Other conditions influencing health status 11/07/2017    No significant past medical history    Other conditions influencing health status 11/07/2017    No significant past surgical history    POTS (postural orthostatic tachycardia syndrome)      Surgical History  Past Surgical History:   Procedure Laterality Date    OTHER SURGICAL HISTORY  09/15/2021    Esophagogastroduodenoscopy    OTHER SURGICAL HISTORY  09/21/2021    Bronchoscopy     Social History  She reports that she has quit smoking. Her smoking use included cigarettes. She uses smokeless tobacco. She reports that she does not currently use alcohol. She reports current drug use. Frequency: 7.00 times per week. Drug: Marijuana.    Family History  No family history on file.     Allergies  Allergies   Allergen Reactions    Latex Rash    Iodinated Contrast Media Hives     Review of Systems   All other systems reviewed and are negative.       Physical Exam  HENT:      Head: Normocephalic.   Cardiovascular:      Rate and Rhythm: Normal rate.   Pulmonary:      Effort: Pulmonary effort is normal.   Abdominal:      General: Abdomen is flat.   Musculoskeletal:      Cervical back: Normal range of motion.   Skin:     General: Skin is warm.   Neurological:      General: No focal deficit present.      Mental Status: She is alert.          Last Recorded Vitals  Blood pressure 104/63, pulse 63, temperature 37.2 °C (99 °F), temperature source Temporal, resp. rate 18, height 1.6 m (5' 3\"), weight 53.1 kg (117 lb), last menstrual period 03/10/2025, SpO2 99%, not currently breastfeeding.    Assessment/Plan   Colonoscopy for BRBPR     Princess Rutherford MD  "

## 2025-03-18 NOTE — ANESTHESIA PREPROCEDURE EVALUATION
Patient: Dayami Tafoya    Procedure Information       Date/Time: 03/18/25 0730    Scheduled providers: Princess Rutherford MD    Procedure: COLONOSCOPY    Location: [facility]     HPI:  24 y.o. female with past medical history of IBS, anal fissures, POTS, hypercoagulable disorder presented 01/11/2025 to ED for 6 days of worsening GI bleeds and abdominal pain.  Patient states for the last 6 days she has had multiple episodes a day of black to bright red blood per rectum.  She endorses abdominal pain diffusely greater in the lower quadrants      Relevant Problems   Anesthesia (within normal limits)      Cardiac (within normal limits)      Pulmonary   (+) Mild asthma (HHS-HCC) (Exercise induced- no resent use)      /Renal (within normal limits)      Liver (within normal limits)      Endocrine (within normal limits)   Pt is NPO after midnight.  No anesthesia complications.  Past Surgical History:   Procedure Laterality Date   • OTHER SURGICAL HISTORY  09/15/2021    Esophagogastroduodenoscopy   • OTHER SURGICAL HISTORY  09/21/2021    Bronchoscopy       Clinical information reviewed:    Allergies  Meds               NPO Detail:  No data recorded     Physical Exam    Airway  Mallampati: II  TM distance: >3 FB  Neck ROM: full  Comments: Double pierced tongue   Cardiovascular   Rhythm: regular  Rate: normal     Dental    Pulmonary - normal exam     Abdominal          Anesthesia Plan    History of general anesthesia?: yes  History of complications of general anesthesia?: no    ASA 2     MAC     The patient is not a current smoker.    intravenous induction   Anesthetic plan and risks discussed with patient.    Plan discussed with [facility].

## 2025-03-24 LAB
LABORATORY COMMENT REPORT: NORMAL
PATH REPORT.FINAL DX SPEC: NORMAL
PATH REPORT.GROSS SPEC: NORMAL
PATH REPORT.RELEVANT HX SPEC: NORMAL
PATH REPORT.TOTAL CANCER: NORMAL

## 2025-05-30 ENCOUNTER — TELEPHONE (OUTPATIENT)
Dept: PRIMARY CARE | Facility: CLINIC | Age: 25
End: 2025-05-30
Payer: COMMERCIAL

## 2025-05-30 NOTE — TELEPHONE ENCOUNTER
PT calling stating she was seen at [facility] and had lab work done. PT states she was told that she was positive for Hep A marker. PT states she was told that if she received the vaccine she could test positive, but PT is very worried. PT requesting a return call to discuss at [phone]

## 2025-05-30 NOTE — TELEPHONE ENCOUNTER
I cannot provide additional advice without more information on the specific hepatitis A testing that was performed.    Immunization record reviewed, she has been fully vaccinated against hepatitis A.    With vaccination or past infection her Hep A IgG antibodies should be positive.  Hep A IgM antibodies indicate recent infection within the past several months.      Immunization History   Administered Date(s) Administered    DTaP vaccine, pediatric  (INFANRIX) 2002, 2002, 01/15/2008    Flu vaccine, trivalent, preservative free, age 6 months and greater (Fluarix/Fluzone/Flulaval) 09/25/2024    HPV 9-valent vaccine (GARDASIL 9) 08/20/2015, 11/04/2016    Hepatitis A vaccine, pediatric/adolescent (HAVRIX, VAQTA) 11/04/2011, 01/29/2013    Hepatitis B vaccine, 19 yrs and under (RECOMBIVAX, ENGERIX) 2002, 2002, 2002    Hib (HbOC) 2002, 2002, 2002, 10/27/2003    Influenza, live, intranasal, quadrivalent 11/04/2011    MMR vaccine, subcutaneous (MMR II) 01/15/2008    Meningococcal ACWY vaccine (MENVEO) 07/15/2020    Meningococcal B vaccine (BEXSERO) 07/15/2020, 02/08/2022    Moderna SARS-CoV-2 Vaccination 05/18/2021, 06/15/2021    Pneumococcal Conjugate PCV 7 2002, 2002    Poliovirus vaccine, subcutaneous (IPOL) 01/15/2008    Varicella vaccine, subcutaneous (VARIVAX) 11/04/2011, 01/29/2013

## 2025-06-05 ENCOUNTER — OFFICE VISIT (OUTPATIENT)
Dept: URGENT CARE | Age: 25
End: 2025-06-05
Payer: COMMERCIAL

## 2025-06-05 ENCOUNTER — ANCILLARY PROCEDURE (OUTPATIENT)
Dept: URGENT CARE | Age: 25
End: 2025-06-05
Payer: COMMERCIAL

## 2025-06-05 VITALS
TEMPERATURE: 98.3 F | RESPIRATION RATE: 18 BRPM | HEART RATE: 70 BPM | BODY MASS INDEX: 20.62 KG/M2 | OXYGEN SATURATION: 98 % | SYSTOLIC BLOOD PRESSURE: 91 MMHG | WEIGHT: 116.4 LBS | HEIGHT: 63 IN | DIASTOLIC BLOOD PRESSURE: 60 MMHG

## 2025-06-05 DIAGNOSIS — R52 PAIN: ICD-10-CM

## 2025-06-05 DIAGNOSIS — S60.041A CONTUSION OF RIGHT RING FINGER WITHOUT DAMAGE TO NAIL, INITIAL ENCOUNTER: Primary | ICD-10-CM

## 2025-06-05 PROCEDURE — 73130 X-RAY EXAM OF HAND: CPT | Mod: RIGHT SIDE | Performed by: NURSE PRACTITIONER

## 2025-06-05 ASSESSMENT — ENCOUNTER SYMPTOMS
ARTHRALGIAS: 1
JOINT SWELLING: 1

## 2025-06-05 NOTE — PROGRESS NOTES
"Subjective   Patient ID: Dayami Tafoya is a 24 y.o. female. They present today with a chief complaint of Finger Injury (Right hand ).    History of Present Illness  Dayami Tafoya is a 24 y.o. female who presents to the clinic for right hand ring finger pain and discomfort.  Patient states she jammed her finger last night.  Patient she has taken Tylenol for the pain. Pt denies any chest pain, sob, N/V at this time in clinic.             Past Medical History  Allergies as of 06/05/2025 - Reviewed 06/05/2025   Allergen Reaction Noted    Latex Rash 02/04/2024    Iodinated contrast media Hives 05/29/2024       Prescriptions Prior to Admission[1]     Medical History[2]    Surgical History[3]     reports that she has quit smoking. Her smoking use included cigarettes. She uses smokeless tobacco. She reports that she does not currently use alcohol. She reports current drug use. Frequency: 7.00 times per week. Drug: Marijuana.    Review of Systems  Review of Systems   Musculoskeletal:  Positive for arthralgias and joint swelling.        Right hand ring finger MCP joint, DIP and PIP joint   All other systems reviewed and are negative.                                 Objective    Vitals:    06/05/25 0817   BP: 91/60   Pulse: 70   Resp: 18   Temp: 36.8 °C (98.3 °F)   TempSrc: Oral   SpO2: 98%   Weight: 52.8 kg (116 lb 6.4 oz)   Height: 1.6 m (5' 3\")     No LMP recorded.    Physical Exam  Constitutional:       Appearance: Normal appearance.   Musculoskeletal:      Right elbow: Normal.      Right wrist: Normal.      Right hand: Tenderness and bony tenderness present. Decreased range of motion.      Comments: Right hand ring finger-tenderness noted on palpation.  Ecchymosis noted around the MCP joint, PIP joint, DIP joint.  Patient is able to move each joint at the MCP PIP DIP but is limited due to pain.  Right ring finger-distal sensation intact cap refill less than 2 seconds.   Neurological:      General: No focal " deficit present.      Mental Status: She is alert and oriented to person, place, and time. Mental status is at baseline.   Psychiatric:         Mood and Affect: Mood normal.         Behavior: Behavior normal.         Procedures    Point of Care Test & Imaging Results from this visit  No results found for this visit on 06/05/25.   Imaging  XR hand right 3+ views  Result Date: 6/5/2025  No acute fracture or malalignment.     Signed by: Anirudh Overton 6/5/2025 8:43 AM Dictation workstation:   [id number]      Cardiology, Vascular, and Other Imaging  No other imaging results found for the past 2 days      Diagnostic study results (if any) were reviewed by SEAMUS Hoffman.    Assessment/Plan   Allergies, medications, history, and pertinent labs/EKGs/Imaging reviewed by SEAMUS Hoffman.     Medical Decision Making   History and examination consistent with contusion. No evidence of compartment syndrome,  or cellulitis. Imaging is negative for fractures or other acute bony abnormalities. Plan is for RICE, metal splint for the next 48 hours, ice, ibuprofen and supportive treatment at home. Return to clinic if s/s change or worsen.      Orders and Diagnoses  Diagnoses and all orders for this visit:  Contusion of right ring finger without damage to nail, initial encounter  Pain  -     XR hand right 3+ views; Future      Medical Admin Record      Patient disposition: Home    Electronically signed by SEAMUS Hoffman  9:11 AM           [1] (Not in a hospital admission)   [2]   Past Medical History:  Diagnosis Date    Anal fissure     Asthma     History of hypercoagulable state     IBS (irritable bowel syndrome)     Other conditions influencing health status 11/07/2017    No significant past medical history    Other conditions influencing health status 11/07/2017    No significant past surgical history    POTS (postural orthostatic tachycardia syndrome)    [3]   Past Surgical History:  Procedure  Laterality Date    OTHER SURGICAL HISTORY  09/15/2021    Esophagogastroduodenoscopy    OTHER SURGICAL HISTORY  09/21/2021    Bronchoscopy

## 2025-06-05 NOTE — PATIENT INSTRUCTIONS
Metal splint-okay to wear during the daytime and take off at night.  -Please wear for the next 48 to 72 hours.  Ice the finger for the next 24 to 48 hours  Ibuprofen/Tylenol for pain.  Please follow with your primary care doctor next 5 to 7 days.  If worsening symptoms, please go to local emergency department for further evaluation.    Please follow up with your primary provider within one week if symptoms do not improve.  You may schedule an appointment online at [email or web address] or call [phone]. Go to the Emergency Department if symptoms significantly worsen or if you develop chest pain or shortness of breath.

## 2025-07-31 ENCOUNTER — OFFICE VISIT (OUTPATIENT)
Dept: URGENT CARE | Age: 25
End: 2025-07-31
Payer: COMMERCIAL

## 2025-07-31 VITALS
TEMPERATURE: 98.7 F | OXYGEN SATURATION: 97 % | DIASTOLIC BLOOD PRESSURE: 74 MMHG | SYSTOLIC BLOOD PRESSURE: 117 MMHG | RESPIRATION RATE: 19 BRPM | HEART RATE: 81 BPM

## 2025-07-31 DIAGNOSIS — J02.9 SORE THROAT: ICD-10-CM

## 2025-07-31 DIAGNOSIS — J01.90 ACUTE BACTERIAL SINUSITIS: Primary | ICD-10-CM

## 2025-07-31 DIAGNOSIS — B96.89 ACUTE BACTERIAL SINUSITIS: Primary | ICD-10-CM

## 2025-07-31 LAB
POC HUMAN RHINOVIRUS PCR: NEGATIVE
POC INFLUENZA A VIRUS PCR: NEGATIVE
POC INFLUENZA B VIRUS PCR: NEGATIVE
POC RESPIRATORY SYNCYTIAL VIRUS PCR: NEGATIVE
POC STREPTOCOCCUS PYOGENES (GROUP A STREP) PCR: NEGATIVE

## 2025-07-31 RX ORDER — METHYLPREDNISOLONE 4 MG/1
TABLET ORAL
Qty: 21 TABLET | Refills: 0 | Status: SHIPPED | OUTPATIENT
Start: 2025-07-31 | End: 2025-08-06

## 2025-07-31 RX ORDER — AMOXICILLIN AND CLAVULANATE POTASSIUM 875; 125 MG/1; MG/1
875 TABLET, FILM COATED ORAL 2 TIMES DAILY
Qty: 20 TABLET | Refills: 0 | Status: SHIPPED | OUTPATIENT
Start: 2025-07-31 | End: 2025-08-10

## 2025-07-31 ASSESSMENT — ENCOUNTER SYMPTOMS
DIARRHEA: 1
FEVER: 1
SINUS PRESSURE: 1
CHILLS: 1
SINUS PAIN: 1
SORE THROAT: 1
FATIGUE: 1
RHINORRHEA: 1

## 2025-07-31 ASSESSMENT — PATIENT HEALTH QUESTIONNAIRE - PHQ9
1. LITTLE INTEREST OR PLEASURE IN DOING THINGS: NOT AT ALL
2. FEELING DOWN, DEPRESSED OR HOPELESS: NOT AT ALL
SUM OF ALL RESPONSES TO PHQ9 QUESTIONS 1 AND 2: 0

## 2025-07-31 NOTE — LETTER
July 31, 2025     Patient: Dayami Tafoya   YOB: 2000   Date of Visit: 7/31/2025       To Whom It May Concern:    It is my medical opinion that Dayami Tafoya may return to work on 08/03/25.    If you have any questions or concerns, please don't hesitate to call.         Sincerely,        RY Roman-CNP    CC: No Recipients

## 2025-07-31 NOTE — PROGRESS NOTES
Subjective   Patient ID: Dayami Tafoya is a 25 y.o. female. They present today with a chief complaint of Sore Throat (Headache, diarrhea. Not eating or sleeping. 3 days).    History of Present Illness  Patient is a 25-year-old female presenting complaining of worsening symptoms of sore throat, headache, sinus congestion with thick green discharge, facial pain, fever, chills and diarrhea.  She reports symptoms onset since last weekend without relief with over-the-counter medications at home.    Past Medical History  Allergies as of 07/31/2025 - Reviewed 07/31/2025   Allergen Reaction Noted    Latex Rash 02/04/2024    Iodinated contrast media Hives 05/29/2024       Prescriptions Prior to Admission[1]     Medical History[2]    Surgical History[3]     reports that she has quit smoking. Her smoking use included cigarettes. She uses smokeless tobacco. She reports that she does not currently use alcohol. She reports current drug use. Frequency: 7.00 times per week. Drug: Marijuana.    Review of Systems  Review of Systems   Constitutional:  Positive for chills, fatigue and fever.   HENT:  Positive for congestion, postnasal drip, rhinorrhea, sinus pressure, sinus pain and sore throat.    Gastrointestinal:  Positive for diarrhea.   All other systems reviewed and are negative.                                 Objective    Vitals:    07/31/25 0921   BP: 117/74   Pulse: 81   Resp: 19   Temp: 37.1 °C (98.7 °F)   TempSrc: Oral   SpO2: 97%     Patient's last menstrual period was 07/08/2025.    Physical Exam  Vitals reviewed.   General: Vitals Noted. No distress. Normocephalic.  Cardiovascular:     Heart sounds: Normal heart sounds, S1 normal and S2 normal. No murmur heard.     No friction rub.   Pulmonary:      Effort: Pulmonary effort is normal.      Breath sounds:  Lungs clear to auscultation bilaterally   HEENT: Left and right TM is within normal limits. No drainage.  EOMI, normal conjunctiva, patent nares, Normal OP  Noted maxillary and frontal sinus tenderness on palpation is present. Pharynx and tonsils are not hyperemic, and without exudate.   Neck: Supple with no adenopathy.  Lymphadenopathy:   No cervical adenopathy on palpation  Lower Body: No right inguinal adenopathy. No left inguinal adenopathy.   Abdominal:      Palpations: There is no hepatomegaly, splenomegaly or mass. Abdomen is soft, non-tender, and non-distended. No suprapubic tenderness. No CVA tenderness.   Skin:     Comments: no rash   Neurological:      Cranial Nerves: Cranial nerves 2-12 are intact.      Sensory: No sensory deficit.      Motor: Motor function is intact.      Deep Tendon Reflexes: Reflexes are normal and symmetric.       Procedures    Point of Care Test & Imaging Results from this visit  Results for orders placed or performed in visit on 07/31/25   POCT SPOTFIRE R/ST Panel Mini w/Strep A ([facility]) manually resulted   Result Value Ref Range    POC Group A Strep, PCR Negative Negative    POC Respiratory Syncytial Virus PCR Negative Negative    POC Influenza A Virus PCR Negative Negative    POC Influenza B Virus PCR Negative Negative    POC Human Rhinovirus PCR Negative Negative      Imaging  No results found.    Cardiology, Vascular, and Other Imaging  No other imaging results found for the past 2 days      Diagnostic study results (if any) were reviewed by SEAMUS Roman.    Assessment/Plan   Allergies, medications, history, and pertinent labs/EKGs/Imaging reviewed by SEAMUS Roman.     Medical Decision Making  Patient presents with symptoms of nasal congestion, cough, worsening thick green nasal discharge, sinus and facial pressure and pain. Symptoms getting worse. No concerns for bronchitis or PNA given lungs are clear on auscultations without wheezing or rhonchi. Spotfire Strep is negative in the office today. Noted facial pressure with maxillary and frontal sinus tenderness on palpation. Purulent nasal discharge. Will  treat for bacterial sinusitis with Augmentin PO BID x10 days. Will add medrol pack to help with severity of congestion and inflammation. Patient agreed with the plan.      Orders and Diagnoses  Diagnoses and all orders for this visit:  Acute bacterial sinusitis  -     amoxicillin-clavulanate (Augmentin) 875-125 mg tablet; Take 1 tablet (875 mg of amoxicillin) by mouth 2 times a day for 10 days.  -     methylPREDNISolone (Medrol Dospak) 4 mg tablets; Follow schedule on package instructions  Sore throat  -     POCT SPOTFIRE R/ST Panel Mini w/Strep A ([facility]) manually resulted      Medical Admin Record      Patient disposition: Home    Electronically signed by SEAMUS Roman  9:51 AM           [1] (Not in a hospital admission)   [2]   Past Medical History:  Diagnosis Date    Anal fissure     Asthma     History of hypercoagulable state     IBS (irritable bowel syndrome)     Other conditions influencing health status 11/07/2017    No significant past medical history    Other conditions influencing health status 11/07/2017    No significant past surgical history    POTS (postural orthostatic tachycardia syndrome)    [3]   Past Surgical History:  Procedure Laterality Date    OTHER SURGICAL HISTORY  09/15/2021    Esophagogastroduodenoscopy    OTHER SURGICAL HISTORY  09/21/2021    Bronchoscopy

## 2025-08-05 ENCOUNTER — APPOINTMENT (OUTPATIENT)
Dept: PRIMARY CARE | Facility: CLINIC | Age: 25
End: 2025-08-05
Payer: COMMERCIAL

## 2025-08-29 ENCOUNTER — HOSPITAL ENCOUNTER (OUTPATIENT)
Dept: RADIOLOGY | Facility: HOSPITAL | Age: 25
Discharge: HOME | End: 2025-08-29
Payer: COMMERCIAL

## 2025-08-29 DIAGNOSIS — S09.90XA UNSPECIFIED INJURY OF HEAD, INITIAL ENCOUNTER: ICD-10-CM

## 2025-08-29 PROCEDURE — 70551 MRI BRAIN STEM W/O DYE: CPT

## 2025-08-29 PROCEDURE — 72141 MRI NECK SPINE W/O DYE: CPT

## 2025-08-29 PROCEDURE — 70544 MR ANGIOGRAPHY HEAD W/O DYE: CPT
